# Patient Record
Sex: FEMALE | Race: WHITE | NOT HISPANIC OR LATINO | Employment: OTHER | URBAN - METROPOLITAN AREA
[De-identification: names, ages, dates, MRNs, and addresses within clinical notes are randomized per-mention and may not be internally consistent; named-entity substitution may affect disease eponyms.]

---

## 2018-04-17 ENCOUNTER — HOSPITAL ENCOUNTER (OUTPATIENT)
Facility: HOSPITAL | Age: 58
Discharge: HOME | End: 2018-04-20
Attending: EMERGENCY MEDICINE | Admitting: HOSPITALIST
Payer: OTHER GOVERNMENT

## 2018-04-17 DIAGNOSIS — K92.2: ICD-10-CM

## 2018-04-17 DIAGNOSIS — D64.9 ANEMIA, UNSPECIFIED TYPE: Primary | ICD-10-CM

## 2018-04-17 DIAGNOSIS — K92.1 MELENA: ICD-10-CM

## 2018-04-17 LAB
ALBUMIN SERPL BCP-MCNC: 3.7 G/DL
ALP SERPL-CCNC: 131 U/L
ALT SERPL W/O P-5'-P-CCNC: 78 U/L
ANION GAP SERPL CALC-SCNC: 8 MMOL/L
AST SERPL-CCNC: 70 U/L
BACTERIA #/AREA URNS AUTO: ABNORMAL /HPF
BASOPHILS # BLD AUTO: 0.07 K/UL
BASOPHILS NFR BLD: 0.6 %
BILIRUB SERPL-MCNC: 0.3 MG/DL
BILIRUB UR QL STRIP: NEGATIVE
BUN SERPL-MCNC: 13 MG/DL
CALCIUM SERPL-MCNC: 9.3 MG/DL
CHLORIDE SERPL-SCNC: 106 MMOL/L
CLARITY UR REFRACT.AUTO: CLEAR
CO2 SERPL-SCNC: 23 MMOL/L
COLOR UR AUTO: YELLOW
CREAT SERPL-MCNC: 0.7 MG/DL
DIFFERENTIAL METHOD: ABNORMAL
EOSINOPHIL # BLD AUTO: 0.2 K/UL
EOSINOPHIL NFR BLD: 1.8 %
ERYTHROCYTE [DISTWIDTH] IN BLOOD BY AUTOMATED COUNT: 21.2 %
EST. GFR  (AFRICAN AMERICAN): >60 ML/MIN/1.73 M^2
EST. GFR  (NON AFRICAN AMERICAN): >60 ML/MIN/1.73 M^2
GLUCOSE SERPL-MCNC: 96 MG/DL
GLUCOSE UR QL STRIP: NEGATIVE
HCT VFR BLD AUTO: 25 %
HGB BLD-MCNC: 7 G/DL
HGB UR QL STRIP: NEGATIVE
IMM GRANULOCYTES # BLD AUTO: 0.03 K/UL
IMM GRANULOCYTES NFR BLD AUTO: 0.3 %
INR PPP: 1
KETONES UR QL STRIP: NEGATIVE
LEUKOCYTE ESTERASE UR QL STRIP: ABNORMAL
LIPASE SERPL-CCNC: 41 U/L
LYMPHOCYTES # BLD AUTO: 3.7 K/UL
LYMPHOCYTES NFR BLD: 32.5 %
MCH RBC QN AUTO: 18 PG
MCHC RBC AUTO-ENTMCNC: 28 G/DL
MCV RBC AUTO: 64 FL
MICROSCOPIC COMMENT: ABNORMAL
MONOCYTES # BLD AUTO: 1.9 K/UL
MONOCYTES NFR BLD: 16.4 %
NEUTROPHILS # BLD AUTO: 5.5 K/UL
NEUTROPHILS NFR BLD: 48.4 %
NITRITE UR QL STRIP: POSITIVE
NRBC BLD-RTO: 0 /100 WBC
PH UR STRIP: 6 [PH] (ref 5–8)
PLATELET # BLD AUTO: 791 K/UL
PMV BLD AUTO: 9.8 FL
POTASSIUM SERPL-SCNC: 4.3 MMOL/L
PROT SERPL-MCNC: 8.7 G/DL
PROT UR QL STRIP: NEGATIVE
PROTHROMBIN TIME: 10.5 SEC
RBC # BLD AUTO: 3.88 M/UL
RBC #/AREA URNS AUTO: 3 /HPF (ref 0–4)
SODIUM SERPL-SCNC: 137 MMOL/L
SP GR UR STRIP: 1 (ref 1–1.03)
SQUAMOUS #/AREA URNS AUTO: 0 /HPF
URN SPEC COLLECT METH UR: ABNORMAL
UROBILINOGEN UR STRIP-ACNC: NEGATIVE EU/DL
WBC # BLD AUTO: 11.26 K/UL
WBC #/AREA URNS AUTO: 15 /HPF (ref 0–5)

## 2018-04-17 PROCEDURE — 99285 EMERGENCY DEPT VISIT HI MDM: CPT | Mod: 25

## 2018-04-17 PROCEDURE — 86870 RBC ANTIBODY IDENTIFICATION: CPT

## 2018-04-17 PROCEDURE — 84145 PROCALCITONIN (PCT): CPT

## 2018-04-17 PROCEDURE — 87086 URINE CULTURE/COLONY COUNT: CPT

## 2018-04-17 PROCEDURE — 96365 THER/PROPH/DIAG IV INF INIT: CPT

## 2018-04-17 PROCEDURE — 96375 TX/PRO/DX INJ NEW DRUG ADDON: CPT

## 2018-04-17 PROCEDURE — 25000003 PHARM REV CODE 250: Performed by: PHYSICIAN ASSISTANT

## 2018-04-17 PROCEDURE — 63600175 PHARM REV CODE 636 W HCPCS: Performed by: PHYSICIAN ASSISTANT

## 2018-04-17 PROCEDURE — C9113 INJ PANTOPRAZOLE SODIUM, VIA: HCPCS | Performed by: PHYSICIAN ASSISTANT

## 2018-04-17 PROCEDURE — 86901 BLOOD TYPING SEROLOGIC RH(D): CPT

## 2018-04-17 PROCEDURE — 87088 URINE BACTERIA CULTURE: CPT

## 2018-04-17 PROCEDURE — 85610 PROTHROMBIN TIME: CPT

## 2018-04-17 PROCEDURE — 85025 COMPLETE CBC W/AUTO DIFF WBC: CPT

## 2018-04-17 PROCEDURE — 87077 CULTURE AEROBIC IDENTIFY: CPT

## 2018-04-17 PROCEDURE — 86922 COMPATIBILITY TEST ANTIGLOB: CPT

## 2018-04-17 PROCEDURE — 93010 ELECTROCARDIOGRAM REPORT: CPT | Mod: ,,, | Performed by: INTERNAL MEDICINE

## 2018-04-17 PROCEDURE — 87186 SC STD MICRODIL/AGAR DIL: CPT

## 2018-04-17 PROCEDURE — 81001 URINALYSIS AUTO W/SCOPE: CPT

## 2018-04-17 PROCEDURE — 99283 EMERGENCY DEPT VISIT LOW MDM: CPT | Mod: ,,, | Performed by: PHYSICIAN ASSISTANT

## 2018-04-17 PROCEDURE — 80053 COMPREHEN METABOLIC PANEL: CPT

## 2018-04-17 PROCEDURE — 93005 ELECTROCARDIOGRAM TRACING: CPT

## 2018-04-17 PROCEDURE — 83690 ASSAY OF LIPASE: CPT

## 2018-04-17 RX ORDER — MORPHINE SULFATE 4 MG/ML
4 INJECTION, SOLUTION INTRAMUSCULAR; INTRAVENOUS
Status: COMPLETED | OUTPATIENT
Start: 2018-04-17 | End: 2018-04-17

## 2018-04-17 RX ORDER — PANTOPRAZOLE SODIUM 40 MG/10ML
80 INJECTION, POWDER, LYOPHILIZED, FOR SOLUTION INTRAVENOUS
Status: DISCONTINUED | OUTPATIENT
Start: 2018-04-17 | End: 2018-04-17

## 2018-04-17 RX ADMIN — DEXTROSE 80 MG: 50 INJECTION, SOLUTION INTRAVENOUS at 11:04

## 2018-04-17 RX ADMIN — MORPHINE SULFATE 4 MG: 4 INJECTION INTRAVENOUS at 11:04

## 2018-04-17 RX ADMIN — SODIUM CHLORIDE 1000 ML: 0.9 INJECTION, SOLUTION INTRAVENOUS at 11:04

## 2018-04-18 ENCOUNTER — SURGERY (OUTPATIENT)
Age: 58
End: 2018-04-18

## 2018-04-18 ENCOUNTER — ANESTHESIA (OUTPATIENT)
Dept: ENDOSCOPY | Facility: HOSPITAL | Age: 58
End: 2018-04-18
Payer: OTHER GOVERNMENT

## 2018-04-18 ENCOUNTER — ANESTHESIA EVENT (OUTPATIENT)
Dept: ENDOSCOPY | Facility: HOSPITAL | Age: 58
End: 2018-04-18
Payer: OTHER GOVERNMENT

## 2018-04-18 PROBLEM — K55.20 AVM (ARTERIOVENOUS MALFORMATION) OF SMALL BOWEL, ACQUIRED: Status: ACTIVE | Noted: 2018-04-18

## 2018-04-18 PROBLEM — T74.91XA DOMESTIC ABUSE OF ADULT: Status: ACTIVE | Noted: 2018-04-18

## 2018-04-18 PROBLEM — N39.0 UTI (URINARY TRACT INFECTION): Status: ACTIVE | Noted: 2018-04-18

## 2018-04-18 PROBLEM — E03.9 ACQUIRED HYPOTHYROIDISM: Status: ACTIVE | Noted: 2018-04-18

## 2018-04-18 PROBLEM — D50.9 IRON DEFICIENCY ANEMIA: Status: ACTIVE | Noted: 2018-04-18

## 2018-04-18 LAB
ABO + RH BLD: NORMAL
ALBUMIN SERPL BCP-MCNC: 3.1 G/DL
ALP SERPL-CCNC: 115 U/L
ALT SERPL W/O P-5'-P-CCNC: 68 U/L
ANION GAP SERPL CALC-SCNC: 9 MMOL/L
APTT BLDCRRT: 22.3 SEC
AST SERPL-CCNC: 57 U/L
BASOPHILS # BLD AUTO: 0.07 K/UL
BASOPHILS # BLD AUTO: 0.09 K/UL
BASOPHILS NFR BLD: 0.5 %
BASOPHILS NFR BLD: 0.6 %
BILIRUB SERPL-MCNC: 0.3 MG/DL
BLD GP AB SCN CELLS X3 SERPL QL: NORMAL
BLD PROD TYP BPU: NORMAL
BLOOD GROUP ANTIBODIES SERPL: NORMAL
BLOOD UNIT EXPIRATION DATE: NORMAL
BLOOD UNIT TYPE CODE: 6200
BLOOD UNIT TYPE: NORMAL
BUN SERPL-MCNC: 10 MG/DL
CALCIUM SERPL-MCNC: 8.4 MG/DL
CHLORIDE SERPL-SCNC: 110 MMOL/L
CK SERPL-CCNC: 33 U/L
CO2 SERPL-SCNC: 22 MMOL/L
CODING SYSTEM: NORMAL
CREAT SERPL-MCNC: 0.7 MG/DL
DIFFERENTIAL METHOD: ABNORMAL
DIFFERENTIAL METHOD: ABNORMAL
DISPENSE STATUS: NORMAL
EOSINOPHIL # BLD AUTO: 0.2 K/UL
EOSINOPHIL # BLD AUTO: 0.3 K/UL
EOSINOPHIL NFR BLD: 1.2 %
EOSINOPHIL NFR BLD: 2.6 %
ERYTHROCYTE [DISTWIDTH] IN BLOOD BY AUTOMATED COUNT: 20.8 %
ERYTHROCYTE [DISTWIDTH] IN BLOOD BY AUTOMATED COUNT: 24.5 %
EST. GFR  (AFRICAN AMERICAN): >60 ML/MIN/1.73 M^2
EST. GFR  (NON AFRICAN AMERICAN): >60 ML/MIN/1.73 M^2
ESTIMATED AVG GLUCOSE: 97 MG/DL
FERRITIN SERPL-MCNC: 8 NG/ML
GLUCOSE SERPL-MCNC: 83 MG/DL
HBA1C MFR BLD HPLC: 5 %
HCT VFR BLD AUTO: 22.4 %
HCT VFR BLD AUTO: 28.3 %
HGB BLD-MCNC: 6.3 G/DL
HGB BLD-MCNC: 8.1 G/DL
IMM GRANULOCYTES # BLD AUTO: 0.03 K/UL
IMM GRANULOCYTES # BLD AUTO: 0.09 K/UL
IMM GRANULOCYTES NFR BLD AUTO: 0.3 %
IMM GRANULOCYTES NFR BLD AUTO: 0.5 %
INR PPP: 1
IRON SERPL-MCNC: 12 UG/DL
LACTATE SERPL-SCNC: 0.8 MMOL/L
LITHIUM SERPL-SCNC: <0.1 MMOL/L
LYMPHOCYTES # BLD AUTO: 2.4 K/UL
LYMPHOCYTES # BLD AUTO: 3.1 K/UL
LYMPHOCYTES NFR BLD: 14 %
LYMPHOCYTES NFR BLD: 27.8 %
MAGNESIUM SERPL-MCNC: 1.9 MG/DL
MCH RBC QN AUTO: 18.1 PG
MCH RBC QN AUTO: 19.4 PG
MCHC RBC AUTO-ENTMCNC: 28.1 G/DL
MCHC RBC AUTO-ENTMCNC: 28.6 G/DL
MCV RBC AUTO: 64 FL
MCV RBC AUTO: 68 FL
MONOCYTES # BLD AUTO: 1.6 K/UL
MONOCYTES # BLD AUTO: 1.8 K/UL
MONOCYTES NFR BLD: 15.8 %
MONOCYTES NFR BLD: 9.3 %
NEUTROPHILS # BLD AUTO: 12.9 K/UL
NEUTROPHILS # BLD AUTO: 6 K/UL
NEUTROPHILS NFR BLD: 52.9 %
NEUTROPHILS NFR BLD: 74.5 %
NRBC BLD-RTO: 0 /100 WBC
NRBC BLD-RTO: 0 /100 WBC
PHOSPHATE SERPL-MCNC: 3.5 MG/DL
PLATELET # BLD AUTO: 765 K/UL
PLATELET # BLD AUTO: 831 K/UL
PMV BLD AUTO: 9.1 FL
PMV BLD AUTO: 9.4 FL
POTASSIUM SERPL-SCNC: 3.9 MMOL/L
PROCALCITONIN SERPL IA-MCNC: 0.05 NG/ML
PROT SERPL-MCNC: 7.3 G/DL
PROTHROMBIN TIME: 10.6 SEC
RBC # BLD AUTO: 3.48 M/UL
RBC # BLD AUTO: 4.17 M/UL
RETICS/RBC NFR AUTO: 1.4 %
SATURATED IRON: 3 %
SODIUM SERPL-SCNC: 141 MMOL/L
T4 FREE SERPL-MCNC: 0.68 NG/DL
TOTAL IRON BINDING CAPACITY: 478 UG/DL
TRANS ERYTHROCYTES VOL PATIENT: NORMAL ML
TRANSFERRIN SERPL-MCNC: 323 MG/DL
TRANSFERRIN SERPL-MCNC: 323 MG/DL
TSH SERPL DL<=0.005 MIU/L-ACNC: 7.79 UIU/ML
WBC # BLD AUTO: 11.28 K/UL
WBC # BLD AUTO: 17.38 K/UL

## 2018-04-18 PROCEDURE — 85610 PROTHROMBIN TIME: CPT

## 2018-04-18 PROCEDURE — D9220A PRA ANESTHESIA: Mod: ,,, | Performed by: ANESTHESIOLOGY

## 2018-04-18 PROCEDURE — 80053 COMPREHEN METABOLIC PANEL: CPT

## 2018-04-18 PROCEDURE — 44369 SMALL BOWEL ENDOSCOPY: CPT | Performed by: INTERNAL MEDICINE

## 2018-04-18 PROCEDURE — 87040 BLOOD CULTURE FOR BACTERIA: CPT | Mod: 59

## 2018-04-18 PROCEDURE — C9113 INJ PANTOPRAZOLE SODIUM, VIA: HCPCS | Performed by: STUDENT IN AN ORGANIZED HEALTH CARE EDUCATION/TRAINING PROGRAM

## 2018-04-18 PROCEDURE — 83735 ASSAY OF MAGNESIUM: CPT

## 2018-04-18 PROCEDURE — 97802 MEDICAL NUTRITION INDIV IN: CPT

## 2018-04-18 PROCEDURE — 84443 ASSAY THYROID STIM HORMONE: CPT

## 2018-04-18 PROCEDURE — P9021 RED BLOOD CELLS UNIT: HCPCS

## 2018-04-18 PROCEDURE — 63600175 PHARM REV CODE 636 W HCPCS: Performed by: STUDENT IN AN ORGANIZED HEALTH CARE EDUCATION/TRAINING PROGRAM

## 2018-04-18 PROCEDURE — 83540 ASSAY OF IRON: CPT

## 2018-04-18 PROCEDURE — 84439 ASSAY OF FREE THYROXINE: CPT

## 2018-04-18 PROCEDURE — 82728 ASSAY OF FERRITIN: CPT

## 2018-04-18 PROCEDURE — 44369 SMALL BOWEL ENDOSCOPY: CPT | Mod: ,,, | Performed by: INTERNAL MEDICINE

## 2018-04-18 PROCEDURE — 63600175 PHARM REV CODE 636 W HCPCS: Performed by: NURSE ANESTHETIST, CERTIFIED REGISTERED

## 2018-04-18 PROCEDURE — G0378 HOSPITAL OBSERVATION PER HR: HCPCS

## 2018-04-18 PROCEDURE — 00731 ANES UPR GI NDSC PX NOS: CPT | Performed by: INTERNAL MEDICINE

## 2018-04-18 PROCEDURE — 37000009 HC ANESTHESIA EA ADD 15 MINS: Performed by: INTERNAL MEDICINE

## 2018-04-18 PROCEDURE — 25000003 PHARM REV CODE 250: Performed by: STUDENT IN AN ORGANIZED HEALTH CARE EDUCATION/TRAINING PROGRAM

## 2018-04-18 PROCEDURE — 99214 OFFICE O/P EST MOD 30 MIN: CPT | Mod: ,,, | Performed by: INTERNAL MEDICINE

## 2018-04-18 PROCEDURE — 37000008 HC ANESTHESIA 1ST 15 MINUTES: Performed by: INTERNAL MEDICINE

## 2018-04-18 PROCEDURE — 36415 COLL VENOUS BLD VENIPUNCTURE: CPT

## 2018-04-18 PROCEDURE — 83605 ASSAY OF LACTIC ACID: CPT

## 2018-04-18 PROCEDURE — 83036 HEMOGLOBIN GLYCOSYLATED A1C: CPT

## 2018-04-18 PROCEDURE — 85025 COMPLETE CBC W/AUTO DIFF WBC: CPT

## 2018-04-18 PROCEDURE — 86902 BLOOD TYPE ANTIGEN DONOR EA: CPT

## 2018-04-18 PROCEDURE — 85730 THROMBOPLASTIN TIME PARTIAL: CPT

## 2018-04-18 PROCEDURE — 86922 COMPATIBILITY TEST ANTIGLOB: CPT

## 2018-04-18 PROCEDURE — 80178 ASSAY OF LITHIUM: CPT

## 2018-04-18 PROCEDURE — 25000003 PHARM REV CODE 250: Performed by: NURSE ANESTHETIST, CERTIFIED REGISTERED

## 2018-04-18 PROCEDURE — 82550 ASSAY OF CK (CPK): CPT

## 2018-04-18 PROCEDURE — 84100 ASSAY OF PHOSPHORUS: CPT

## 2018-04-18 PROCEDURE — 85045 AUTOMATED RETICULOCYTE COUNT: CPT

## 2018-04-18 PROCEDURE — 99220 PR INITIAL OBSERVATION CARE,LEVL III: CPT | Mod: ,,, | Performed by: INTERNAL MEDICINE

## 2018-04-18 RX ORDER — PROPOFOL 10 MG/ML
VIAL (ML) INTRAVENOUS CONTINUOUS PRN
Status: DISCONTINUED | OUTPATIENT
Start: 2018-04-18 | End: 2018-04-18

## 2018-04-18 RX ORDER — SODIUM CHLORIDE 0.9 % (FLUSH) 0.9 %
5 SYRINGE (ML) INJECTION
Status: DISCONTINUED | OUTPATIENT
Start: 2018-04-18 | End: 2018-04-20 | Stop reason: HOSPADM

## 2018-04-18 RX ORDER — TRAZODONE HYDROCHLORIDE 100 MG/1
100 TABLET ORAL NIGHTLY
Status: DISCONTINUED | OUTPATIENT
Start: 2018-04-18 | End: 2018-04-20 | Stop reason: HOSPADM

## 2018-04-18 RX ORDER — IBUPROFEN 200 MG
16 TABLET ORAL
Status: DISCONTINUED | OUTPATIENT
Start: 2018-04-18 | End: 2018-04-20 | Stop reason: HOSPADM

## 2018-04-18 RX ORDER — CEFTRIAXONE 1 G/1
1 INJECTION, POWDER, FOR SOLUTION INTRAMUSCULAR; INTRAVENOUS
Status: DISCONTINUED | OUTPATIENT
Start: 2018-04-18 | End: 2018-04-20 | Stop reason: HOSPADM

## 2018-04-18 RX ORDER — SODIUM CHLORIDE 0.9 % (FLUSH) 0.9 %
3 SYRINGE (ML) INJECTION
Status: DISCONTINUED | OUTPATIENT
Start: 2018-04-18 | End: 2018-04-20 | Stop reason: HOSPADM

## 2018-04-18 RX ORDER — LEVOTHYROXINE SODIUM 112 UG/1
112 TABLET ORAL DAILY
Status: DISCONTINUED | OUTPATIENT
Start: 2018-04-18 | End: 2018-04-20 | Stop reason: HOSPADM

## 2018-04-18 RX ORDER — SODIUM CHLORIDE 9 MG/ML
INJECTION, SOLUTION INTRAVENOUS CONTINUOUS
Status: DISCONTINUED | OUTPATIENT
Start: 2018-04-18 | End: 2018-04-19

## 2018-04-18 RX ORDER — HYDROCODONE BITARTRATE AND ACETAMINOPHEN 5; 325 MG/1; MG/1
1 TABLET ORAL EVERY 6 HOURS PRN
Status: DISCONTINUED | OUTPATIENT
Start: 2018-04-18 | End: 2018-04-20 | Stop reason: HOSPADM

## 2018-04-18 RX ORDER — LANOLIN ALCOHOL/MO/W.PET/CERES
800 CREAM (GRAM) TOPICAL
Status: DISCONTINUED | OUTPATIENT
Start: 2018-04-18 | End: 2018-04-18

## 2018-04-18 RX ORDER — HYDROCODONE BITARTRATE AND ACETAMINOPHEN 500; 5 MG/1; MG/1
TABLET ORAL
Status: DISCONTINUED | OUTPATIENT
Start: 2018-04-18 | End: 2018-04-19

## 2018-04-18 RX ORDER — SODIUM CHLORIDE 9 MG/ML
INJECTION, SOLUTION INTRAVENOUS CONTINUOUS PRN
Status: DISCONTINUED | OUTPATIENT
Start: 2018-04-18 | End: 2018-04-18

## 2018-04-18 RX ORDER — AMITRIPTYLINE HYDROCHLORIDE 100 MG/1
100 TABLET ORAL NIGHTLY
Status: DISCONTINUED | OUTPATIENT
Start: 2018-04-18 | End: 2018-04-20 | Stop reason: HOSPADM

## 2018-04-18 RX ORDER — POTASSIUM CHLORIDE 20 MEQ/15ML
40 SOLUTION ORAL
Status: DISCONTINUED | OUTPATIENT
Start: 2018-04-18 | End: 2018-04-18

## 2018-04-18 RX ORDER — GLUCAGON 1 MG
1 KIT INJECTION
Status: DISCONTINUED | OUTPATIENT
Start: 2018-04-18 | End: 2018-04-20 | Stop reason: HOSPADM

## 2018-04-18 RX ORDER — IBUPROFEN 200 MG
24 TABLET ORAL
Status: DISCONTINUED | OUTPATIENT
Start: 2018-04-18 | End: 2018-04-20 | Stop reason: HOSPADM

## 2018-04-18 RX ORDER — PROPOFOL 10 MG/ML
VIAL (ML) INTRAVENOUS
Status: DISCONTINUED | OUTPATIENT
Start: 2018-04-18 | End: 2018-04-18

## 2018-04-18 RX ORDER — ACETAMINOPHEN 325 MG/1
650 TABLET ORAL EVERY 4 HOURS PRN
Status: DISCONTINUED | OUTPATIENT
Start: 2018-04-18 | End: 2018-04-20 | Stop reason: HOSPADM

## 2018-04-18 RX ORDER — LITHIUM CARBONATE 450 MG/1
450 TABLET ORAL EVERY 12 HOURS
Status: DISCONTINUED | OUTPATIENT
Start: 2018-04-18 | End: 2018-04-20 | Stop reason: HOSPADM

## 2018-04-18 RX ORDER — LIDOCAINE HYDROCHLORIDE 10 MG/ML
INJECTION, SOLUTION INTRAVENOUS
Status: DISCONTINUED | OUTPATIENT
Start: 2018-04-18 | End: 2018-04-18

## 2018-04-18 RX ADMIN — HYDROCODONE BITARTRATE AND ACETAMINOPHEN 1 TABLET: 5; 325 TABLET ORAL at 09:04

## 2018-04-18 RX ADMIN — HYDROCODONE BITARTRATE AND ACETAMINOPHEN 1 TABLET: 5; 325 TABLET ORAL at 04:04

## 2018-04-18 RX ADMIN — TRAZODONE HYDROCHLORIDE 100 MG: 100 TABLET ORAL at 03:04

## 2018-04-18 RX ADMIN — HYDROCODONE BITARTRATE AND ACETAMINOPHEN 1 TABLET: 5; 325 TABLET ORAL at 02:04

## 2018-04-18 RX ADMIN — AMITRIPTYLINE HYDROCHLORIDE 100 MG: 100 TABLET, FILM COATED ORAL at 04:04

## 2018-04-18 RX ADMIN — SODIUM CHLORIDE: 0.9 INJECTION, SOLUTION INTRAVENOUS at 01:04

## 2018-04-18 RX ADMIN — DEXTROSE 40 MG: 50 INJECTION, SOLUTION INTRAVENOUS at 08:04

## 2018-04-18 RX ADMIN — TRAZODONE HYDROCHLORIDE 100 MG: 100 TABLET ORAL at 09:04

## 2018-04-18 RX ADMIN — LITHIUM CARBONATE 450 MG: 450 TABLET, EXTENDED RELEASE ORAL at 10:04

## 2018-04-18 RX ADMIN — DEXTROSE 40 MG: 50 INJECTION, SOLUTION INTRAVENOUS at 09:04

## 2018-04-18 RX ADMIN — LEVOTHYROXINE SODIUM 112 MCG: 112 TABLET ORAL at 02:04

## 2018-04-18 RX ADMIN — AMITRIPTYLINE HYDROCHLORIDE 100 MG: 100 TABLET, FILM COATED ORAL at 09:04

## 2018-04-18 RX ADMIN — CEFTRIAXONE SODIUM 1 G: 1 INJECTION, POWDER, FOR SOLUTION INTRAMUSCULAR; INTRAVENOUS at 03:04

## 2018-04-18 RX ADMIN — PROPOFOL 100 MG: 10 INJECTION, EMULSION INTRAVENOUS at 01:04

## 2018-04-18 RX ADMIN — PROPOFOL 125 MCG/KG/MIN: 10 INJECTION, EMULSION INTRAVENOUS at 01:04

## 2018-04-18 RX ADMIN — LITHIUM CARBONATE 450 MG: 450 TABLET, EXTENDED RELEASE ORAL at 02:04

## 2018-04-18 RX ADMIN — LIDOCAINE HYDROCHLORIDE 100 MG: 10 INJECTION, SOLUTION INTRAVENOUS at 01:04

## 2018-04-18 NOTE — ASSESSMENT & PLAN NOTE
- Off Lithium for the last 6 months, no episodes of manuel   - Resumed Lithium for now and ensure her medications prescription prior discharge

## 2018-04-18 NOTE — ASSESSMENT & PLAN NOTE
- Recurrent DVT, and PE (per patient [id number]) and she was on long term anticoagulation with Warfarin with frequent GI bleeding and on 2016 she stopped taking anticoagulation
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
## 2018-04-18 NOTE — PLAN OF CARE
Problem: Patient Care Overview  Goal: Plan of Care Review  Outcome: Ongoing (interventions implemented as appropriate)  Recommendations     1. When medically feasible, ADAT to clear liquid, then regular.   2. If unable to advance diet & parenteral nutrition warranted, please re-consult RD.   3. RD to monitor & follow-up.

## 2018-04-18 NOTE — ASSESSMENT & PLAN NOTE
-  Known to have angioectasia in the stomach in previous EGD 2015 which was treated with argon plasma coagulation.   - She was on chronic anticoagulation until 2016 where she stopped taking Warfarin

## 2018-04-18 NOTE — PLAN OF CARE
[facility] [address] [place] [address] ph# [phone]      PHARMACY- [address] [address]    PT HAS A RIDE HOME AND FAMILY SUPPORT WITH FRIEND. PT WAS STAYING IN A [facility], HOWEVER SHE HAS NOW MOVE IN WITH A FRIEND.        04/18/18 1102   Discharge Assessment   Assessment Type Discharge Planning Assessment   Confirmed/corrected address and phone number on facesheet? Yes   Assessment information obtained from? Patient   Expected Length of Stay (days) 3   Communicated expected length of stay with patient/caregiver yes   Prior to hospitilization cognitive status: Alert/Oriented   Prior to hospitalization functional status: Independent   Current cognitive status: Alert/Oriented   Current Functional Status: Independent   Lives With friend(s)   Able to Return to Prior Arrangements yes   Is patient able to care for self after discharge? Yes   Who are your caregiver(s) and their phone number(s)? Pt's friend    Patient's perception of discharge disposition homeless   Readmission Within The Last 30 Days no previous admission in last 30 days   Patient currently being followed by outpatient case management? No   Patient currently receives any other outside agency services? No   Equipment Currently Used at Home none   Do you have any problems affording any of your prescribed medications? No   Is the patient taking medications as prescribed? yes   Does the patient have transportation home? Yes   Transportation Available car;family or friend will provide   Does the patient receive services at the [facility]? No   Discharge Plan A Home   Discharge Plan B Home   Patient/Family In Agreement With Plan yes

## 2018-04-18 NOTE — PROVATION PATIENT INSTRUCTIONS
Discharge Summary/Instructions after an Endoscopic Procedure  Patient Name: Shreya Bae  Patient MRN: [id number]  Patient YOB: 1960 Wednesday, April 18, 2018  Dean Bae MD  RESTRICTIONS:  During your procedure today, you received medications for sedation.  These   medications may affect your judgment, balance and coordination.  Therefore,   for 24 hours, you have the following restrictions:   - DO NOT drive a car, operate machinery, make legal/financial decisions,   sign important papers or drink alcohol.    ACTIVITY:  The following day: return to full activity including work, except no heavy   lifting, straining or running for 3 days if polyps were removed.  DIET:  Eat and drink normally unless instructed otherwise.     TREATMENT FOR COMMON SIDE EFFECTS:  - Mild abdominal pain, nausea, belching, bloating or excessive gas:  rest,   eat lightly and use a heating pad.  - Sore Throat: treat with throat lozenges and/or gargle with warm salt   water.  - Because air was used during the procedure, expelling large amounts of air   from your rectum or belching is normal.  - If a bowel prep was taken, you may not have a bowel movement for 1-3 days.    This is normal.  SYMPTOMS TO WATCH FOR AND REPORT TO YOUR PHYSICIAN:  1. Abdominal pain or bloating, other than gas cramps.  2. Chest pain.  3. Back pain.  4. Signs of infection such as: chills or fever occurring within 24 hours   after the procedure.  5. Rectal bleeding, which would show as bright red, maroon, or black stools.   (A tablespoon of blood from the rectum is not serious, especially if   hemorrhoids are present.)  6. Vomiting.  7. Weakness or dizziness.  GO DIRECTLY TO THE NEAREST EMERGENCY ROOM IF YOU HAVE ANY OF THE FOLLOWING:      Difficulty breathing              Chills and/or fever over 101 F   Persistent vomiting and/or vomiting blood   Severe abdominal pain   Severe chest pain   Black, tarry stools   Bleeding- more than one tablespoon   Any  other symptom or condition that you feel may need urgent attention  Your doctor recommends these additional instructions:  If any biopsies were taken, your doctors clinic will contact you in 1 to 2   weeks with any results.  - Return patient to [facility] for ongoing care.   - Advance diet as tolerated.   - Continue present medications.   - Return to referring physician after studies are complete.   if bleeding persists, then a video capsule would be next step  For questions, problems or results please call your physician - Dean Bae MD at Work:  [phone].  [facility], EMERGENCY ROOM PHONE NUMBER: [phone]  IF A COMPLICATION OR EMERGENCY SITUATION ARISES AND YOU ARE UNABLE TO REACH   YOUR PHYSICIAN - GO DIRECTLY TO THE EMERGENCY ROOM.  Dean Bae MD  4/18/2018 2:18:54 PM  This report has been verified and signed electronically.
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
## 2018-04-18 NOTE — ASSESSMENT & PLAN NOTE
- She has been off her medication for the last 6 months, will repeat TSH and T4 and resume her Levothyroxine   - Stable problem, no acute change, continue current medication.
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
## 2018-04-18 NOTE — TRANSFER OF CARE
"Anesthesia Transfer of Care Note    Patient: Shreya Bae    Procedure(s) Performed: Procedure(s) (LRB):  ESOPHAGOGASTRODUODENOSCOPY (EGD) (N/A)    Patient location: [facility]    Anesthesia Type: general    Transport from OR: Transported from OR on 2-3 L/min O2 by NC with adequate spontaneous ventilation    Post pain: adequate analgesia    Post assessment: no apparent anesthetic complications and tolerated procedure well    Post vital signs: stable    Level of consciousness: alert, awake and oriented    Nausea/Vomiting: no nausea/vomiting    Complications: none    Transfer of care protocol was followed      Last vitals:   Visit Vitals  BP (!) 161/69   Pulse 86   Temp 37.1 °C (98.7 °F) (Temporal)   Resp 18   Ht 5' 7" (1.702 m)   Wt 80.4 kg (177 lb 4 oz)   SpO2 99%   Breastfeeding? No   BMI 27.76 kg/m²     "

## 2018-04-18 NOTE — CONSULTS
"  [facility]  Adult Nutrition  Consult Note    SUMMARY     Recommendations    1. When medically feasible, ADAT to clear liquid, then regular.   2. If unable to advance diet & parenteral nutrition warranted, please re-consult RD.   3. RD to monitor & follow-up.    Goals: Meet % EEN, EPN  Nutrition Goal Status: new  Communication of RD Recs: reviewed with RN    Reason for Assessment    Reason for Assessment: consult  Diagnosis: other (see comments) (Melena)  Relevant Medical History: Depression  Interdisciplinary Rounds: did not attend    General Information Comments: Pt currently NPO. Decreased appetite & slight wt loss PTA.   Nutrition Discharge Planning: Adequate nutrition    Nutrition/Diet History    Patient Reported Diet/Restrictions/Preferences: general  Do you have any cultural, spiritual, Voodoo conflicts, given your current situation?: none  Factors Affecting Nutritional Intake: NPO, abdominal pain    Anthropometrics    Temp: 97.8 °F (36.6 °C)  Height Method: Stated  Height: 5' 7" (170.2 cm)  Height (inches): 67 in  Weight Method: Bed Scale  Weight: 80.4 kg (177 lb 4 oz)  Weight (lb): 177.25 lb  Ideal Body Weight (IBW), Female: 135 lb  % Ideal Body Weight, Female (lb): 131.3 lb  BMI (Calculated): 27.8  BMI Grade: 25 - 29.9 - overweight  Usual Body Weight (UBW), k kg  % Usual Body Weight: 92.61  % Weight Change From Usual Weight: -7.59 %    Lab/Procedures/Meds    Pertinent Labs Reviewed: reviewed  Pertinent Labs Comments: Stable  Pertinent Medications Reviewed: reviewed    Physical Findings/Assessment    Overall Physical Appearance: nourished  Oral/Mouth Cavity: WDL  Skin: intact    Estimated/Assessed Needs    Weight Used For Calorie Calculations: 80.4 kg (177 lb 4 oz)  Energy Calorie Requirements (kcal): 1765 kcal/d  Energy Need Method: Pettibone-St Jeor (1.25 PAL)     Protein Requirements: 80 g/d (1 g/kg)  Weight Used For Protein Calculations: 80.4 kg (177 lb 4 oz)     Fluid " Need Method: other (see comments) (Per MD or 1 mL/kcal)    Nutrition Prescription Ordered    Current Diet Order: NPO    Nutrition Risk    Level of Risk/Frequency of Follow-up: high     Assessment and Plan    * Melena      Nutrition Problem  Inadequate energy intake    Related to (etiology):   Inability to consume sufficient energy    Signs and Symptoms (as evidenced by):   NPO with no alternate means of nutrition     Nutrition Diagnosis Status:   New         Monitor and Evaluation    Food and Nutrient Intake: energy intake, food and beverage intake  Food and Nutrient Adminstration: diet order  Physical Activity and Function: nutrition-related ADLs and IADLs  Anthropometric Measurements: weight, weight change  Biochemical Data, Medical Tests and Procedures: lipid profile, inflammatory profile, glucose/endocrine profile, electrolyte and renal panel, gastrointestinal profile  Nutrition-Focused Physical Findings: overall appearance     Nutrition Follow-Up    RD Follow-up?: Yes
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
## 2018-04-18 NOTE — ASSESSMENT & PLAN NOTE
- Chronic recurrent UTI, and she has bilateral urinary bladder what is seems to be sacral neuromodulation for treatment of neurogenic bladder.  - UTI is positive nitrite and leukocyte and patient endorsed symptoms of UTI  - Treat with Ceftriaxone 1 g ~ 24 as complicated UTI   -Awaiting urine culture sensitivities, plan to discharge on PO antibiotics one resulted

## 2018-04-18 NOTE — NURSING TRANSFER
Nursing Transfer Note      4/18/2018     Transfer To: 10th floor  Room 1034A     Transfer via stretcher    Transfer with IV Pump    Transported by [employer] sent: n/a    Chart send with patient: Yes    Notified: Echo YAP    Patient reassessed at: 1420 4/18/18 (date, time)    Upon arrival to floor: patient oriented to room, call bell in reach and bed in lowest position

## 2018-04-18 NOTE — ASSESSMENT & PLAN NOTE
- Known to have angioectasia in the stomach in previous EGD 2015 which was treated with argon plasma coagulation.   - DDx of her Melena could be from iron supplement, or new AVM bleeding.   - Received protonix 80 mg IV bolus, will continue on Pantoprazole 40 mg IV BID  - No need for intravascular resuscitation as her Hb around baseline and she is hemodynamically stable  - Discontinue all NSAIDs and Heparin products  - No signs of coagulopathy and she does not take anticoagulation for the last two years.  - Maintain IV access with 2 large bore IVs, BID CBC and Blood bank contact to prepare her unit of pRBC (needs special one given her antibodies).   - Keep NPO now, and GI consult placed for possible evaluation/intervention by morning

## 2018-04-18 NOTE — ASSESSMENT & PLAN NOTE
DDx include ulcers in the setting of NSAIDs and RNY bypass. She has prior history of AVMs, which could be the source. ENRIQUE is likely multifactorial in the setting of inadequate absorption as well as loss.     Plan:  Protonix IV BID  Intravascular resuscitation/support with IVFs   Serial H/H's and pRBCs transfusion as indicated  Discontinue all NSAIDs and Heparin products  Please correct any coagulopathy with platelets and FFP to a goal of platelets >50K and INR <2.0  Maintain IV access with 2 large bore IVs  NPO  Plan for EGD today or emergently if there is hemodynamic compromise in the setting of overt GI bleeding  Please obtain OSH EGD/colon records if possible.   Please notify GI team if there is significant change in patient's clinical status

## 2018-04-18 NOTE — NURSING
RN called blood bank about pt's RBCs. Blood bank stated that the blood probably won't be ready for another couple of hours. Paged Med 5. Awaiting return call.

## 2018-04-18 NOTE — NURSING
Pt arrived to unit. AVSS. Pt complaining of abdominal pain 8 out 10. Norco ordered and administered. Oriented pt to room. Awaiting RBCs from blood bank. Call light in reach. WCTM

## 2018-04-18 NOTE — ED PROVIDER NOTES
Encounter Date: 4/17/2018       History     Chief Complaint   Patient presents with    Abdominal Pain     pt report increasingly worse abd pain since yesterday    Abnormal Lab     pt [facility] and being told her Hemoglobin was 6 and pt needed a blood transfusion; pt reports hx of anemia      57-year-old female arrives to the ER for evaluation of generalized abdominal pain and weakness.  Extensive past medical history of multiple autoimmune disorders.  She has extensive past medical history of chronic GI bleeding.  She has not been here in a couple of years but has had no recent GI bleeds.  Last endoscopy performed 7 months ago and she was told it was normal.  Prior to that she was having recurrent chronic AVM bleeds requiring multiple admissions and transfusions and scopes.   Currently complaining of abdominal pain worsening for the past couple of days generalized weakness and malaise for the past 2 weeks.  Apparently was seen at an outside facility yesterday and was told her hemoglobin was 6.  Upon arrival here, her vital signs are stable, she appears well and non toxic.   She does endorse change in BM, stools more black in nature past few weeks, which is not abnormal for her.           Review of patient's allergies indicates:   Allergen Reactions    Ciprofloxacin Hives    Nsaids (non-steroidal anti-inflammatory drug)      Gi bleed    Phenobarbital Hives    Sulfa (sulfonamide antibiotics) Hives     Past Medical History:   Diagnosis Date    Bipolar 1 disorder     Depression     DVT (deep vein thrombosis) in pregnancy     GERD (gastroesophageal reflux disease)     Insomnia     Thyroid disease      Past Surgical History:   Procedure Laterality Date    ABDOMINAL SURGERY      APPENDECTOMY      CARPAL TUNNEL RELEASE Bilateral     CHOLECYSTECTOMY      GASTRIC BYPASS      HYSTERECTOMY      spleenectomy      THYROID CYST EXCISION      TONSILLECTOMY       No family history on  file.  Social History   Substance Use Topics    Smoking status: Current Every Day Smoker     Packs/day: 0.50     Years: 25.00     Types: Cigarettes    Smokeless tobacco: Not on file    Alcohol use Yes      Comment: Socially     Review of Systems   Constitutional: Negative for fever.   HENT: Negative for sore throat.    Respiratory: Negative for shortness of breath.    Cardiovascular: Negative for chest pain.   Gastrointestinal: Positive for abdominal pain and blood in stool. Negative for nausea.   Genitourinary: Negative for dysuria.   Musculoskeletal: Negative for back pain.   Skin: Negative for rash.   Neurological: Positive for weakness.   Hematological: Does not bruise/bleed easily.       Physical Exam     Initial Vitals [04/17/18 2059]   BP Pulse Resp Temp SpO2   (!) 198/85 90 16 98.5 °F (36.9 °C) 99 %      MAP       122.67         Physical Exam    Constitutional: Vital signs are normal. She appears well-developed and well-nourished.   HENT:   Head: Normocephalic and atraumatic.   Eyes: Conjunctivae are normal.   Cardiovascular: Normal rate and regular rhythm. Exam reveals no gallop and no friction rub.    No murmur heard.  Pulmonary/Chest: Breath sounds normal. No respiratory distress. She has no wheezes. She has no rhonchi. She has no rales.   Abdominal: Soft. Normal appearance and bowel sounds are normal. There is no tenderness.   Genitourinary: Rectal exam shows guaiac negative stool. Guaiac negative stool.   Genitourinary Comments: Brown stool, Heme negative   Musculoskeletal: Normal range of motion.   Neurological: She is alert and oriented to person, place, and time.   Skin: Skin is warm and intact.   Psychiatric: She has a normal mood and affect. Her speech is normal and behavior is normal. Cognition and memory are normal.         ED Course   Procedures  Labs Reviewed   CBC W/ AUTO DIFFERENTIAL - Abnormal; Notable for the following:        Result Value    RBC 3.88 (*)     Hemoglobin 7.0 (*)      Hematocrit 25.0 (*)     MCV 64 (*)     MCH 18.0 (*)     MCHC 28.0 (*)     RDW 21.2 (*)     Platelets 791 (*)     Mono # 1.9 (*)     Mono% 16.4 (*)     All other components within normal limits   COMPREHENSIVE METABOLIC PANEL - Abnormal; Notable for the following:     Total Protein 8.7 (*)     AST 70 (*)     ALT 78 (*)     All other components within normal limits   URINALYSIS, REFLEX TO URINE CULTURE - Abnormal; Notable for the following:     Nitrite, UA Positive (*)     Leukocytes, UA 3+ (*)     All other components within normal limits   URINALYSIS MICROSCOPIC - Abnormal; Notable for the following:     WBC, UA 15 (*)     All other components within normal limits   CULTURE, URINE   LIPASE   PROTIME-INR   TYPE & SCREEN   ANTIBODY IDENTIFICATION   TYPE & SCREEN   PREPARE RBC SOFT             Medical Decision Making:   ED Management:  57-year-old female with generalized malaise and weakness.   Hemoglobin here 7.0  Rectal exam Brown stool Hemoccult negative.   Decision was made to transfuse 1 unit and admitt to observation.  Case discussed with hospitalist                      Clinical Impression:   The encounter diagnosis was Anemia, unspecified type.    Disposition:   Disposition: Placed in Observation  Condition: Stable                        Cole Mcnally PA-C  04/18/18 0148
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
## 2018-04-18 NOTE — SUBJECTIVE & OBJECTIVE
Past Medical History:   Diagnosis Date    Bipolar 1 disorder     Depression     DVT (deep vein thrombosis) in pregnancy     GERD (gastroesophageal reflux disease)     Insomnia     Thyroid disease        Past Surgical History:   Procedure Laterality Date    ABDOMINAL SURGERY      APPENDECTOMY      CARPAL TUNNEL RELEASE Bilateral     CHOLECYSTECTOMY      GASTRIC BYPASS      HYSTERECTOMY      spleenectomy      THYROID CYST EXCISION      TONSILLECTOMY         Review of patient's allergies indicates:   Allergen Reactions    Ciprofloxacin Hives    Nsaids (non-steroidal anti-inflammatory drug)      Gi bleed    Phenobarbital Hives    Sulfa (sulfonamide antibiotics) Hives     Family History     None        Social History Main Topics    Smoking status: Current Every Day Smoker     Packs/day: 0.50     Years: 25.00     Types: Cigarettes    Smokeless tobacco: Not on file    Alcohol use Yes      Comment: Socially    Drug use: Unknown    Sexual activity: Not on file     Review of Systems   Constitutional: Positive for appetite change and fatigue. Negative for activity change and fever.   HENT: Negative for dental problem.    Respiratory: Negative for cough, shortness of breath and wheezing.    Cardiovascular: Negative for chest pain and leg swelling.   Gastrointestinal: Positive for abdominal pain, blood in stool and constipation. Negative for diarrhea, nausea and vomiting.   Genitourinary: Positive for difficulty urinating, dysuria and frequency. Negative for flank pain and hematuria.   Musculoskeletal: Negative for arthralgias and back pain.   Skin: Negative for color change.   Neurological: Negative for weakness.   Psychiatric/Behavioral: Negative for agitation.     Objective:     Vital Signs (Most Recent):  Temp: 97.8 °F (36.6 °C) (04/18/18 0949)  Pulse: 77 (04/18/18 0949)  Resp: 12 (04/18/18 0949)  BP: 136/62 (04/18/18 0949)  SpO2: 96 % (04/18/18 0949) Vital Signs (24h Range):  Temp:  [97.8 °F (36.6  °C)-98.5 °F (36.9 °C)] 97.8 °F (36.6 °C)  Pulse:  [67-90] 77  Resp:  [12-20] 12  SpO2:  [92 %-99 %] 96 %  BP: (136-198)/(62-85) 136/62     Weight: 80.4 kg (177 lb 4 oz) (04/18/18 0347)  Body mass index is 27.76 kg/m².      Intake/Output Summary (Last 24 hours) at 04/18/18 0957  Last data filed at 04/18/18 0414   Gross per 24 hour   Intake               60 ml   Output                0 ml   Net               60 ml       Lines/Drains/Airways     Peripheral Intravenous Line                 Peripheral IV - Single Lumen 04/17/18 2330 Right Antecubital less than 1 day         Peripheral IV - Single Lumen 04/18/18 0306 Left Hand less than 1 day                Physical Exam   Constitutional: She is oriented to person, place, and time. She appears well-developed and well-nourished.   Eyes: No scleral icterus.   Neck: Neck supple.   Cardiovascular: Normal rate and regular rhythm.  Exam reveals no gallop.    Pulmonary/Chest: Effort normal. No respiratory distress. She has no wheezes.   Abdominal: Soft. Bowel sounds are normal. She exhibits no distension and no mass. There is tenderness. There is no rebound and no guarding. No hernia.   Abdominal scars   Musculoskeletal: She exhibits no edema.   Neurological: She is alert and oriented to person, place, and time.   Skin: Skin is warm.   Psychiatric: She has a normal mood and affect. Her behavior is normal.     Lab Results   Component Value Date    WBC 11.28 04/18/2018    HGB 6.3 (L) 04/18/2018    HCT 22.4 (L) 04/18/2018    MCV 64 (L) 04/18/2018     (H) 04/18/2018     Lab Results   Component Value Date    INR 1.0 04/18/2018     Lab Results   Component Value Date     04/18/2018    K 3.9 04/18/2018    CREATININE 0.7 04/18/2018     Lab Results   Component Value Date    ALBUMIN 3.1 (L) 04/18/2018    ALT 68 (H) 04/18/2018    AST 57 (H) 04/18/2018    ALKPHOS 115 04/18/2018    BILITOT 0.3 04/18/2018     No results found for: AFP  Lab Results   Component Value Date    LIPASE  41 04/17/2018    AMYLASE 33 04/10/2006     No results found for: TACROLIMUS    Imaging:  Reviewed and as noted in HPI.

## 2018-04-18 NOTE — NURSING
Patient transferred back to the floor. Blood transfusion stopped in endoscopy at an unknown time. Patient reports headache otherwise swallows fine

## 2018-04-18 NOTE — NURSING
PRBC started with a second nurse co-sign. She was educated on s/s of transfusion reaction. Patient denies any s/s of transfusion reaction. Transfusion turned up to 125cc/hr. Endoscopy called for report, updated patient on time of the procedure.

## 2018-04-18 NOTE — ED NOTES
Blood bank contacted ED and states blood will not be ready for transfusion until at least 06:00. Pt and MD aware.

## 2018-04-18 NOTE — ASSESSMENT & PLAN NOTE
- Microcytic anemia on chronic long taking of Iron supplement   - Will repeat Retic count and iron studies, and hold Iron supplement for now
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
## 2018-04-19 PROBLEM — M54.12 CERVICAL RADICULOPATHY: Status: ACTIVE | Noted: 2018-04-19

## 2018-04-19 LAB
ALBUMIN SERPL BCP-MCNC: 3.2 G/DL
ALP SERPL-CCNC: 113 U/L
ALT SERPL W/O P-5'-P-CCNC: 59 U/L
ANION GAP SERPL CALC-SCNC: 8 MMOL/L
AST SERPL-CCNC: 52 U/L
BASOPHILS # BLD AUTO: 0.09 K/UL
BASOPHILS # BLD AUTO: 0.12 K/UL
BASOPHILS # BLD AUTO: 0.13 K/UL
BASOPHILS NFR BLD: 0.8 %
BASOPHILS NFR BLD: 1.2 %
BASOPHILS NFR BLD: 1.4 %
BILIRUB SERPL-MCNC: 0.3 MG/DL
BUN SERPL-MCNC: 9 MG/DL
CALCIUM SERPL-MCNC: 8.2 MG/DL
CHLORIDE SERPL-SCNC: 104 MMOL/L
CO2 SERPL-SCNC: 25 MMOL/L
CREAT SERPL-MCNC: 0.7 MG/DL
DIFFERENTIAL METHOD: ABNORMAL
EOSINOPHIL # BLD AUTO: 0.3 K/UL
EOSINOPHIL NFR BLD: 2.3 %
EOSINOPHIL NFR BLD: 3 %
EOSINOPHIL NFR BLD: 3.8 %
ERYTHROCYTE [DISTWIDTH] IN BLOOD BY AUTOMATED COUNT: 23.9 %
ERYTHROCYTE [DISTWIDTH] IN BLOOD BY AUTOMATED COUNT: 23.9 %
ERYTHROCYTE [DISTWIDTH] IN BLOOD BY AUTOMATED COUNT: 24.1 %
EST. GFR  (AFRICAN AMERICAN): >60 ML/MIN/1.73 M^2
EST. GFR  (NON AFRICAN AMERICAN): >60 ML/MIN/1.73 M^2
GLUCOSE SERPL-MCNC: 75 MG/DL
HCT VFR BLD AUTO: 26 %
HCT VFR BLD AUTO: 26.4 %
HCT VFR BLD AUTO: 27.8 %
HGB BLD-MCNC: 7.4 G/DL
HGB BLD-MCNC: 7.6 G/DL
HGB BLD-MCNC: 8 G/DL
IMM GRANULOCYTES # BLD AUTO: 0.02 K/UL
IMM GRANULOCYTES # BLD AUTO: 0.03 K/UL
IMM GRANULOCYTES # BLD AUTO: 0.05 K/UL
IMM GRANULOCYTES NFR BLD AUTO: 0.2 %
IMM GRANULOCYTES NFR BLD AUTO: 0.3 %
IMM GRANULOCYTES NFR BLD AUTO: 0.5 %
LYMPHOCYTES # BLD AUTO: 2.7 K/UL
LYMPHOCYTES # BLD AUTO: 3.1 K/UL
LYMPHOCYTES # BLD AUTO: 3.1 K/UL
LYMPHOCYTES NFR BLD: 26.4 %
LYMPHOCYTES NFR BLD: 27 %
LYMPHOCYTES NFR BLD: 33.9 %
MAGNESIUM SERPL-MCNC: 2.1 MG/DL
MCH RBC QN AUTO: 19 PG
MCH RBC QN AUTO: 19.3 PG
MCH RBC QN AUTO: 19.3 PG
MCHC RBC AUTO-ENTMCNC: 28.5 G/DL
MCHC RBC AUTO-ENTMCNC: 28.8 G/DL
MCHC RBC AUTO-ENTMCNC: 28.8 G/DL
MCV RBC AUTO: 67 FL
MONOCYTES # BLD AUTO: 1.6 K/UL
MONOCYTES # BLD AUTO: 1.7 K/UL
MONOCYTES # BLD AUTO: 1.8 K/UL
MONOCYTES NFR BLD: 15.5 %
MONOCYTES NFR BLD: 16 %
MONOCYTES NFR BLD: 18.4 %
NEUTROPHILS # BLD AUTO: 3.8 K/UL
NEUTROPHILS # BLD AUTO: 5.4 K/UL
NEUTROPHILS # BLD AUTO: 6.2 K/UL
NEUTROPHILS NFR BLD: 42.3 %
NEUTROPHILS NFR BLD: 52.9 %
NEUTROPHILS NFR BLD: 54.1 %
NRBC BLD-RTO: 0 /100 WBC
NRBC BLD-RTO: 1 /100 WBC
NRBC BLD-RTO: 1 /100 WBC
PHOSPHATE SERPL-MCNC: 3.8 MG/DL
PLATELET # BLD AUTO: 709 K/UL
PLATELET # BLD AUTO: 715 K/UL
PLATELET # BLD AUTO: 731 K/UL
PMV BLD AUTO: 9 FL
PMV BLD AUTO: 9.1 FL
PMV BLD AUTO: 9.2 FL
POTASSIUM SERPL-SCNC: 4.1 MMOL/L
PROT SERPL-MCNC: 7.6 G/DL
RBC # BLD AUTO: 3.89 M/UL
RBC # BLD AUTO: 3.93 M/UL
RBC # BLD AUTO: 4.15 M/UL
SODIUM SERPL-SCNC: 137 MMOL/L
WBC # BLD AUTO: 10.18 K/UL
WBC # BLD AUTO: 11.48 K/UL
WBC # BLD AUTO: 9.06 K/UL

## 2018-04-19 PROCEDURE — 25000003 PHARM REV CODE 250: Performed by: STUDENT IN AN ORGANIZED HEALTH CARE EDUCATION/TRAINING PROGRAM

## 2018-04-19 PROCEDURE — G8987 SELF CARE CURRENT STATUS: HCPCS | Mod: CI

## 2018-04-19 PROCEDURE — 80053 COMPREHEN METABOLIC PANEL: CPT

## 2018-04-19 PROCEDURE — G8978 MOBILITY CURRENT STATUS: HCPCS | Mod: CI

## 2018-04-19 PROCEDURE — G8980 MOBILITY D/C STATUS: HCPCS | Mod: CI

## 2018-04-19 PROCEDURE — 99225 PR SUBSEQUENT OBSERVATION CARE,LEVEL II: CPT | Mod: ,,, | Performed by: INTERNAL MEDICINE

## 2018-04-19 PROCEDURE — G8988 SELF CARE GOAL STATUS: HCPCS | Mod: CH

## 2018-04-19 PROCEDURE — 36415 COLL VENOUS BLD VENIPUNCTURE: CPT

## 2018-04-19 PROCEDURE — 97530 THERAPEUTIC ACTIVITIES: CPT

## 2018-04-19 PROCEDURE — 97165 OT EVAL LOW COMPLEX 30 MIN: CPT

## 2018-04-19 PROCEDURE — 63600175 PHARM REV CODE 636 W HCPCS: Performed by: STUDENT IN AN ORGANIZED HEALTH CARE EDUCATION/TRAINING PROGRAM

## 2018-04-19 PROCEDURE — 84100 ASSAY OF PHOSPHORUS: CPT

## 2018-04-19 PROCEDURE — 83735 ASSAY OF MAGNESIUM: CPT

## 2018-04-19 PROCEDURE — G0378 HOSPITAL OBSERVATION PER HR: HCPCS

## 2018-04-19 PROCEDURE — 97161 PT EVAL LOW COMPLEX 20 MIN: CPT

## 2018-04-19 PROCEDURE — G8979 MOBILITY GOAL STATUS: HCPCS | Mod: CI

## 2018-04-19 PROCEDURE — 85025 COMPLETE CBC W/AUTO DIFF WBC: CPT | Mod: 91

## 2018-04-19 RX ORDER — CEPHALEXIN 500 MG/1
500 CAPSULE ORAL EVERY 12 HOURS
Qty: 16 CAPSULE | Refills: 0 | Status: SHIPPED | OUTPATIENT
Start: 2018-04-19 | End: 2018-04-27

## 2018-04-19 RX ORDER — PANTOPRAZOLE SODIUM 40 MG/1
40 TABLET, DELAYED RELEASE ORAL DAILY
Status: DISCONTINUED | OUTPATIENT
Start: 2018-04-19 | End: 2018-04-20 | Stop reason: HOSPADM

## 2018-04-19 RX ADMIN — LEVOTHYROXINE SODIUM 112 MCG: 112 TABLET ORAL at 09:04

## 2018-04-19 RX ADMIN — PANTOPRAZOLE SODIUM 40 MG: 40 TABLET, DELAYED RELEASE ORAL at 09:04

## 2018-04-19 RX ADMIN — HYDROCODONE BITARTRATE AND ACETAMINOPHEN 1 TABLET: 5; 325 TABLET ORAL at 03:04

## 2018-04-19 RX ADMIN — CEFTRIAXONE SODIUM 1 G: 1 INJECTION, POWDER, FOR SOLUTION INTRAMUSCULAR; INTRAVENOUS at 02:04

## 2018-04-19 RX ADMIN — HYDROCODONE BITARTRATE AND ACETAMINOPHEN 1 TABLET: 5; 325 TABLET ORAL at 09:04

## 2018-04-19 RX ADMIN — LITHIUM CARBONATE 450 MG: 450 TABLET, EXTENDED RELEASE ORAL at 09:04

## 2018-04-19 RX ADMIN — LITHIUM CARBONATE 450 MG: 450 TABLET, EXTENDED RELEASE ORAL at 12:04

## 2018-04-19 RX ADMIN — AMITRIPTYLINE HYDROCHLORIDE 100 MG: 100 TABLET, FILM COATED ORAL at 09:04

## 2018-04-19 RX ADMIN — TRAZODONE HYDROCHLORIDE 100 MG: 100 TABLET ORAL at 09:04
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
## 2018-04-19 NOTE — ASSESSMENT & PLAN NOTE
- She has been off her medication for the last 6 months, high TSH and low T4 consistent with hypothyroidism, resuming levothyroxine.   -Follow up with PCP/priority clinic to check thyroid labs again   - Stable problem, no acute change, continue current medication.

## 2018-04-19 NOTE — ANESTHESIA POSTPROCEDURE EVALUATION
"Anesthesia Post Evaluation    Patient: Shreya Bae    Procedure(s) Performed: Procedure(s) (LRB):  ESOPHAGOGASTRODUODENOSCOPY (EGD) (N/A)    Final Anesthesia Type: general  Patient location during evaluation: PACU  Patient participation: Yes- Able to Participate  Level of consciousness: awake and alert  Post-procedure vital signs: reviewed and stable  Pain management: adequate  Airway patency: patent  PONV status at discharge: No PONV  Anesthetic complications: no      Cardiovascular status: blood pressure returned to baseline  Respiratory status: unassisted  Hydration status: euvolemic  Follow-up not needed.        Visit Vitals  /62 (Patient Position: Lying)   Pulse 90   Temp 37.2 °C (99 °F) (Oral)   Resp 18   Ht 5' 7" (1.702 m)   Wt 80.4 kg (177 lb 4 oz)   SpO2 97%   Breastfeeding? No   BMI 27.76 kg/m²       Pain/Dennis Score: Pain Assessment Performed: Yes (4/18/2018  8:00 AM)  Presence of Pain: denies (4/18/2018  2:29 PM)  Pain Rating Prior to Med Admin: 9 (4/18/2018  2:57 PM)  Pain Rating Post Med Admin: 7 (4/18/2018  5:20 AM)  Dennis Score: 10 (4/18/2018  2:10 PM)      "

## 2018-04-19 NOTE — ASSESSMENT & PLAN NOTE
- Recurrent DVT, and PE (per patient [id number]) and she was on long term anticoagulation with Warfarin with frequent GI bleeding and on 2016 she stopped taking anticoagulation

## 2018-04-19 NOTE — HOSPITAL COURSE
04/19: EGD with 2 small non-bleeding angiectasias, cauterized by GI. S/P PRBC transfusion. H & H at baseline. Stable at 8.8 & 30.9. Patient also was treated for re-current UTI, urine culture with Klebsiella treated with three days of Rocephin, discharged with PO Keflex for a total treatment of 10 days. Patient also complained of cervical radiculopathy, CT neck revealed no acute process but stable chronic changes from previous laminectomy/procedures. Patient is to follow with GI. Patient also needs to follow up with PCP but due to her insurance, she is unable to follow with priority clinic or PCP in [facility]. SW to give some recommendations up on discharge. Patient will be going to a friend's house on d/c.
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
## 2018-04-19 NOTE — PLAN OF CARE
Problem: Physical Therapy Goal  Goal: Physical Therapy Goal  Outcome: Outcome(s) achieved Date Met: 04/19/18  Patient does not require skilled acute PT services at this time.   Tong Jiang, PT

## 2018-04-19 NOTE — ASSESSMENT & PLAN NOTE
- Microcytic anemia on chronic long taking of Iron supplement   - Will repeat Retic count and iron studies, and hold Iron supplement for now   -Iron studies consistent with iron deficiency anemia, likely secondary to by pass surgery  -consider OP IV iron transfusion.

## 2018-04-19 NOTE — ASSESSMENT & PLAN NOTE
- Off Lithium for the last 6 months, no episodes of manuel   - Resumed Lithium for now and ensure her medications prescription prior discharge   -Lithium level low 0.1, c/w lithium

## 2018-04-19 NOTE — PLAN OF CARE
Problem: Patient Care Overview  Goal: Plan of Care Review  Outcome: Ongoing (interventions implemented as appropriate)  No acute changes overnight. IV abx given as ordered. Pt slept during majority of shift. No falls or injury during shift. Call light in reach. TM
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
## 2018-04-19 NOTE — NURSING
Patient inquired if she was going to be seen by a neurologist and have an MRI, stating that this is the reason she is staying an additional day. The team was paged and stated she was not going to be seen by neurology and that she might have a MRI, her urine is being cultured for proper abx administration. The patient was made aware of the plan of care, she states she is leaving tomorrow despite the culture results and the team was made aware of this.
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
## 2018-04-19 NOTE — ASSESSMENT & PLAN NOTE
- Known to have angioectasia in the stomach in previous EGD 2015 which was treated with argon plasma coagulation.   - DDx of her Melena could be from iron supplement, or new AVM bleeding.   - Received protonix 80 mg IV bolus, will continue on Pantoprazole 40 mg IV BID  - No need for intravascular resuscitation as her Hb around baseline and she is hemodynamically stable  - Discontinue all NSAIDs and Heparin products  - No signs of coagulopathy and she does not take anticoagulation for the last two years.  - Maintain IV access with 2 large bore IVs,   -Transfused 1 U of PRBC, H & H stable with clinical improvement  -EGD yesterday, revealed 2 non-bleeding angio ectasias, please see procedure report, cauterized.  -GI follow up on discharge.   -

## 2018-04-20 VITALS
SYSTOLIC BLOOD PRESSURE: 109 MMHG | RESPIRATION RATE: 12 BRPM | OXYGEN SATURATION: 96 % | BODY MASS INDEX: 27.09 KG/M2 | HEIGHT: 67 IN | HEART RATE: 78 BPM | TEMPERATURE: 98 F | DIASTOLIC BLOOD PRESSURE: 54 MMHG | WEIGHT: 172.63 LBS

## 2018-04-20 LAB
ALBUMIN SERPL BCP-MCNC: 3.3 G/DL
ALP SERPL-CCNC: 115 U/L
ALT SERPL W/O P-5'-P-CCNC: 57 U/L
ANION GAP SERPL CALC-SCNC: 6 MMOL/L
AST SERPL-CCNC: 48 U/L
BACTERIA UR CULT: NORMAL
BASOPHILS # BLD AUTO: 0.11 K/UL
BASOPHILS # BLD AUTO: 0.12 K/UL
BASOPHILS NFR BLD: 1.2 %
BASOPHILS NFR BLD: 1.3 %
BILIRUB SERPL-MCNC: 0.3 MG/DL
BUN SERPL-MCNC: 11 MG/DL
CALCIUM SERPL-MCNC: 9.3 MG/DL
CHLORIDE SERPL-SCNC: 104 MMOL/L
CO2 SERPL-SCNC: 28 MMOL/L
CREAT SERPL-MCNC: 0.8 MG/DL
DIFFERENTIAL METHOD: ABNORMAL
DIFFERENTIAL METHOD: ABNORMAL
EOSINOPHIL # BLD AUTO: 0.4 K/UL
EOSINOPHIL # BLD AUTO: 0.5 K/UL
EOSINOPHIL NFR BLD: 3.8 %
EOSINOPHIL NFR BLD: 5.2 %
ERYTHROCYTE [DISTWIDTH] IN BLOOD BY AUTOMATED COUNT: 24.6 %
ERYTHROCYTE [DISTWIDTH] IN BLOOD BY AUTOMATED COUNT: 25.4 %
EST. GFR  (AFRICAN AMERICAN): >60 ML/MIN/1.73 M^2
EST. GFR  (NON AFRICAN AMERICAN): >60 ML/MIN/1.73 M^2
GLUCOSE SERPL-MCNC: 82 MG/DL
HCT VFR BLD AUTO: 27.2 %
HCT VFR BLD AUTO: 30.9 %
HGB BLD-MCNC: 8.1 G/DL
HGB BLD-MCNC: 8.8 G/DL
IMM GRANULOCYTES # BLD AUTO: 0.02 K/UL
IMM GRANULOCYTES # BLD AUTO: 0.02 K/UL
IMM GRANULOCYTES NFR BLD AUTO: 0.2 %
IMM GRANULOCYTES NFR BLD AUTO: 0.2 %
LYMPHOCYTES # BLD AUTO: 2.4 K/UL
LYMPHOCYTES # BLD AUTO: 3.4 K/UL
LYMPHOCYTES NFR BLD: 26.2 %
LYMPHOCYTES NFR BLD: 35.8 %
MAGNESIUM SERPL-MCNC: 2.2 MG/DL
MCH RBC QN AUTO: 19.3 PG
MCH RBC QN AUTO: 19.6 PG
MCHC RBC AUTO-ENTMCNC: 28.5 G/DL
MCHC RBC AUTO-ENTMCNC: 29.8 G/DL
MCV RBC AUTO: 66 FL
MCV RBC AUTO: 68 FL
MONOCYTES # BLD AUTO: 1.2 K/UL
MONOCYTES # BLD AUTO: 1.7 K/UL
MONOCYTES NFR BLD: 13.3 %
MONOCYTES NFR BLD: 17.7 %
NEUTROPHILS # BLD AUTO: 3.8 K/UL
NEUTROPHILS # BLD AUTO: 5 K/UL
NEUTROPHILS NFR BLD: 39.8 %
NEUTROPHILS NFR BLD: 55.3 %
NRBC BLD-RTO: 1 /100 WBC
NRBC BLD-RTO: 1 /100 WBC
PHOSPHATE SERPL-MCNC: 4 MG/DL
PLATELET # BLD AUTO: 482 K/UL
PLATELET # BLD AUTO: 827 K/UL
PMV BLD AUTO: 9.5 FL
PMV BLD AUTO: 9.8 FL
POTASSIUM SERPL-SCNC: 4.2 MMOL/L
PROT SERPL-MCNC: 8.1 G/DL
RBC # BLD AUTO: 4.13 M/UL
RBC # BLD AUTO: 4.55 M/UL
SODIUM SERPL-SCNC: 138 MMOL/L
WBC # BLD AUTO: 9.12 K/UL
WBC # BLD AUTO: 9.53 K/UL

## 2018-04-20 PROCEDURE — G0378 HOSPITAL OBSERVATION PER HR: HCPCS

## 2018-04-20 PROCEDURE — 63600175 PHARM REV CODE 636 W HCPCS: Performed by: STUDENT IN AN ORGANIZED HEALTH CARE EDUCATION/TRAINING PROGRAM

## 2018-04-20 PROCEDURE — 99217 PR OBSERVATION CARE DISCHARGE: CPT | Mod: ,,, | Performed by: INTERNAL MEDICINE

## 2018-04-20 PROCEDURE — 25000003 PHARM REV CODE 250: Performed by: STUDENT IN AN ORGANIZED HEALTH CARE EDUCATION/TRAINING PROGRAM

## 2018-04-20 PROCEDURE — 84100 ASSAY OF PHOSPHORUS: CPT

## 2018-04-20 PROCEDURE — 80053 COMPREHEN METABOLIC PANEL: CPT

## 2018-04-20 PROCEDURE — 83735 ASSAY OF MAGNESIUM: CPT

## 2018-04-20 PROCEDURE — 36415 COLL VENOUS BLD VENIPUNCTURE: CPT

## 2018-04-20 PROCEDURE — 85025 COMPLETE CBC W/AUTO DIFF WBC: CPT

## 2018-04-20 RX ADMIN — LEVOTHYROXINE SODIUM 112 MCG: 112 TABLET ORAL at 06:04

## 2018-04-20 RX ADMIN — HYDROCODONE BITARTRATE AND ACETAMINOPHEN 1 TABLET: 5; 325 TABLET ORAL at 01:04

## 2018-04-20 RX ADMIN — CEFTRIAXONE SODIUM 1 G: 1 INJECTION, POWDER, FOR SOLUTION INTRAMUSCULAR; INTRAVENOUS at 01:04

## 2018-04-20 RX ADMIN — LITHIUM CARBONATE 450 MG: 450 TABLET, EXTENDED RELEASE ORAL at 08:04

## 2018-04-20 RX ADMIN — PANTOPRAZOLE SODIUM 40 MG: 40 TABLET, DELAYED RELEASE ORAL at 08:04

## 2018-04-20 RX ADMIN — HYDROCODONE BITARTRATE AND ACETAMINOPHEN 1 TABLET: 5; 325 TABLET ORAL at 07:04
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
## 2018-04-20 NOTE — PLAN OF CARE
Problem: Patient Care Overview  Goal: Plan of Care Review  Outcome: Ongoing (interventions implemented as appropriate)  AAOX4 this shift. No active bleeding noted. No c/o pain. Reports neck stiff. Ambulated in hallway with steady gait. Anticipates discharge to home today.

## 2018-04-20 NOTE — ASSESSMENT & PLAN NOTE
- Recurrent DVT, and PE (per patient [id number]) and she was on long term anticoagulation with Warfarin with frequent GI bleeding and on 2016 she stopped taking anticoagulation

## 2018-04-20 NOTE — ASSESSMENT & PLAN NOTE
- Chronic recurrent UTI, and she has bilateral urinary bladder what is seems to be sacral neuromodulation for treatment of neurogenic bladder.  - UTI is positive nitrite and leukocyte and patient endorsed symptoms of UTI  - Treat with Ceftriaxone 1 g ~ 24 as complicated UTI -given for three days  -Urine culture with Klebsiella sensitive to 1st generation cephalosporins, discharge home with PO keflex for a total of 10 day Abx Rx

## 2018-04-20 NOTE — ASSESSMENT & PLAN NOTE
- Known to have angioectasia in the stomach in previous EGD 2015 which was treated with argon plasma coagulation.   - DDx of her Melena could be from iron supplement, or new AVM bleeding.   - Received protonix 80 mg IV bolus, will continue on Pantoprazole 40 mg IV BID  - No need for intravascular resuscitation as her Hb around baseline and she is hemodynamically stable  - Discontinue all NSAIDs and Heparin products  - No signs of coagulopathy and she does not take anticoagulation for the last two years.  - Maintain IV access with 2 large bore IVs,   -Transfused 1 U of PRBC, H & H stable with clinical improvement, 8.8 & 30.9  -EGD yesterday, revealed 2 non-bleeding angio ectasias, please see procedure report, cauterized.  -GI follow up on discharge.   -
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
## 2018-04-21 LAB
BLD PROD TYP BPU: NORMAL
BLD PROD TYP BPU: NORMAL
BLOOD UNIT EXPIRATION DATE: NORMAL
BLOOD UNIT EXPIRATION DATE: NORMAL
BLOOD UNIT TYPE CODE: 6200
BLOOD UNIT TYPE CODE: 6200
BLOOD UNIT TYPE: NORMAL
BLOOD UNIT TYPE: NORMAL
CODING SYSTEM: NORMAL
CODING SYSTEM: NORMAL
DISPENSE STATUS: NORMAL
DISPENSE STATUS: NORMAL
TRANS ERYTHROCYTES VOL PATIENT: NORMAL ML
TRANS ERYTHROCYTES VOL PATIENT: NORMAL ML

## 2018-04-23 LAB
BACTERIA BLD CULT: NORMAL
BACTERIA BLD CULT: NORMAL

## 2019-05-03 PROBLEM — L98.5: Status: ACTIVE | Noted: 2019-05-03

## 2019-07-15 PROBLEM — N39.0 UTI (URINARY TRACT INFECTION): Status: RESOLVED | Noted: 2018-04-18 | Resolved: 2019-07-15

## 2019-07-15 PROBLEM — D50.9 IRON DEFICIENCY ANEMIA: Status: RESOLVED | Noted: 2018-04-18 | Resolved: 2019-07-15

## 2019-07-15 PROBLEM — Z98.84 HX OF BARIATRIC SURGERY: Status: ACTIVE | Noted: 2019-07-15

## 2019-07-15 PROBLEM — Z98.890 HX OF CERVICAL SPINE SURGERY: Status: ACTIVE | Noted: 2019-07-15

## 2019-07-15 PROBLEM — Z87.74 HX OF ARTERIOVENOUS MALFORMATION (AVM): Status: RESOLVED | Noted: 2019-07-15 | Resolved: 2019-07-15

## 2019-07-15 PROBLEM — Z87.19 HISTORY OF GI BLEED: Status: ACTIVE | Noted: 2019-07-15

## 2019-07-15 PROBLEM — Z87.74 HX OF ARTERIOVENOUS MALFORMATION (AVM): Status: ACTIVE | Noted: 2019-07-15

## 2019-07-16 PROBLEM — M25.512 LEFT SHOULDER PAIN: Status: ACTIVE | Noted: 2019-07-16

## 2019-08-07 PROBLEM — M85.80 OSTEOPENIA WITH HIGH RISK OF FRACTURE: Status: ACTIVE | Noted: 2019-08-07

## 2019-09-06 ENCOUNTER — NURSE TRIAGE (OUTPATIENT)
Dept: ADMINISTRATIVE | Facility: CLINIC | Age: 59
End: 2019-09-06
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
## 2019-09-09 NOTE — TELEPHONE ENCOUNTER
Called and spoke with pt to see if she was able to get Arthrotec 75mg filled with [facility]. Pt reports she was unable to get medication due to fixed income, and [employer] did not apply to her since she is on Medicaid. Pt reporting that the swelling did go down in her knee, but she would like to be able to get something to help with pain. Pt asking if pcp can prescribe something else. Informed that pcp will be notified of request. Please advise. Thanks

## 2019-09-09 NOTE — TELEPHONE ENCOUNTER
I'm pretty sure you can still use good RX, you just can't try to use it for same prescription as medicaid.    Otherwise can try sending it to [facility] pharmacy, or long process for PA on medication.

## 2019-09-10 NOTE — TELEPHONE ENCOUNTER
Called and spoke with pt. Informed that arthrotec is $23 plus tax at [facility]. Pt requested to have medication called in to [facility] pharm (done).

## 2019-12-07 ENCOUNTER — HOSPITAL ENCOUNTER (OUTPATIENT)
Facility: HOSPITAL | Age: 59
Discharge: HOME | End: 2019-12-09
Attending: HOSPITALIST | Admitting: HOSPITALIST
Payer: MEDICAID

## 2019-12-07 DIAGNOSIS — M54.12 CERVICAL RADICULOPATHY: ICD-10-CM

## 2019-12-07 DIAGNOSIS — H66.004 RECURRENT ACUTE SUPPURATIVE OTITIS MEDIA OF RIGHT EAR WITHOUT SPONTANEOUS RUPTURE OF TYMPANIC MEMBRANE: Primary | ICD-10-CM

## 2019-12-07 DIAGNOSIS — A41.9 SEPSIS: ICD-10-CM

## 2019-12-07 PROCEDURE — G0378 HOSPITAL OBSERVATION PER HR: HCPCS

## 2019-12-07 PROCEDURE — G0379 DIRECT REFER HOSPITAL OBSERV: HCPCS

## 2019-12-07 NOTE — PLAN OF CARE
[facility] Patient Flow Center Transfer Acceptance Note    FOR [facility] -  Please call extension [phone] (if nobody answers, this will flip to a beeper, so put in your call back number) upon patient arrival to floor for Hospital Medicine admit team assignment and for additional admit orders for the patient.  Do not page the attending, staff physician associate with the patient on arrival (may not be in-house at the time of arrival).  Rather, always call [phone] to reach the triage physician for orders and team assignment.     Transferring Facility/Hospital: [facility]     Referring Provider/Specialty giving report: Dr. Deloris Long - emergency med    Accepting Physician for admission to hospital: Gabriel Shields MD    Date of acceptance:  12/7/2019   5:44 PM      Patients name: Shreya Bae     Allergies:  Review of patient's allergies indicates:   Allergen Reactions    Ciprofloxacin Hives    Nsaids (non-steroidal anti-inflammatory drug) Other (See Comments)     Sensitive stomach, has had GI bleed.  AVM's.  Only short term use okay.    Phenobarbital Hives    Sulfa (sulfonamide antibiotics) Hives    Tramadol         Reason for transfer:  ENT consultation     Overview/ Report from Physician/Mid-Level Provider:    HPI:   60 y/o with right ear pain and drainage, s/p multiple outpatient abx courses for recurrent otitis media and ambulatory notes indicating that patient has been reporting purulent drainage that has not resolved despite recent outpatient anditbiotic courses.  A referral for ENT had been placed after 12/01 appt but patient has not been able to be seen yet.     Today she complains in the ED of Right ear pain, low grade fever, cough,   -Clinic notes indicate patient followed for recurrent acute suppurative otitis media with spontaneous TM rupture.  On 12/01 Rx for cefdinir and clindamycin.    -Also notes in November indicate patient has been treated also with amoxicillin, doxycycline then a  course of augmentin, report of patient stating purulent drainage from left ear.      Blood cultures - zosyn, vancomycin - azithro  30cc/kg fluids     Med Hx - Autoimmune disease - Eosinophilic Myositis, Scleromysedema, Bipolar 1D/o, bipolar d/o, GERD, DVT, Hx of GI Bleed, Neuromuscula d/o, Hypothyroidism  Surg Hx - prior cervical spine surgery, prior T-L spinal surgery,fusion,  s/p IVC filter    Exam - right ear retracted, TM abnormal looking, tender over         VS: Temp:  [99.1 °F (37.3 °C)]   Pulse:  [71-87]   Resp:  [15-20]   BP: (153-178)/(76-91)   SpO2:  [97 %-98 %]     Labs:   Lab Results   Component Value Date    WBC 14.97 (H) 12/07/2019    HGB 12.7 12/07/2019    HCT 38.5 12/07/2019    MCV 85 12/07/2019     (H) 12/07/2019       CMP  Sodium   Date Value Ref Range Status   12/07/2019 141 136 - 145 mmol/L Final     Potassium   Date Value Ref Range Status   12/07/2019 4.4 3.5 - 5.1 mmol/L Final     Chloride   Date Value Ref Range Status   12/07/2019 106 95 - 110 mmol/L Final     CO2   Date Value Ref Range Status   12/07/2019 24 23 - 29 mmol/L Final     Glucose   Date Value Ref Range Status   12/07/2019 90 70 - 110 mg/dL Final     BUN, Bld   Date Value Ref Range Status   12/07/2019 8 8 - 23 mg/dL Final     Creatinine   Date Value Ref Range Status   12/07/2019 0.7 0.5 - 1.4 mg/dL Final     Calcium   Date Value Ref Range Status   12/07/2019 9.5 8.7 - 10.5 mg/dL Final     Total Protein   Date Value Ref Range Status   12/07/2019 8.2 6.0 - 8.4 g/dL Final     Albumin   Date Value Ref Range Status   12/07/2019 4.0 3.5 - 5.2 g/dL Final     Total Bilirubin   Date Value Ref Range Status   12/07/2019 0.4 0.1 - 1.0 mg/dL Final     Comment:     For infants and newborns, interpretation of results should be based  on gestational age, weight and in agreement with clinical  observations.  Premature Infant recommended reference ranges:  Up to 24 hours.............<8.0 mg/dL  Up to 48 hours............<12.0 mg/dL  3-5  days..................<15.0 mg/dL  6-29 days.................<15.0 mg/dL       Alkaline Phosphatase   Date Value Ref Range Status   12/07/2019 62 55 - 135 U/L Final     AST   Date Value Ref Range Status   12/07/2019 24 10 - 40 U/L Final     ALT   Date Value Ref Range Status   12/07/2019 15 10 - 44 U/L Final     Anion Gap   Date Value Ref Range Status   12/07/2019 11 8 - 16 mmol/L Final     eGFR if    Date Value Ref Range Status   12/07/2019 >60.0 >60 mL/min/1.73 m^2 Final     eGFR if non    Date Value Ref Range Status   12/07/2019 >60.0 >60 mL/min/1.73 m^2 Final     Comment:     Calculation used to obtain the estimated glomerular filtration  rate (eGFR) is the CKD-EPI equation.            Radiographs:   EXAMINATION:  CT HEAD WITHOUT CONTRAST    CLINICAL HISTORY:  Headache, acute, norm neuro exam;    TECHNIQUE:  Axial images obtained of brain without contrast    COMPARISON:  CT head 04/02/2003.    FINDINGS:  The ventricular system and cortical sulci are normal size.  No intracranial space-occupying mass, mass effect or focal parenchymal abnormality is seen.    Visualized paranasal sinuses are clear.  Opacification of inferior right mastoid air cells.      Impression       Normal CT of brain.    Opacification inferior right mastoid air cells.      Electronically signed by: Feli Garnett MD  Date: 12/07/2019         To Do List upon arrival:    1.  ENT recommends CT Temporal Bone with thin cuts - non-contrast study.  Did discuss case with Dr. Ortega and he reviewed CT head, he does not note cortical erosion that would give concern for mastoiditis, he did mention evaluating for additional source of infection.    -Patient had Blood cultures drawn and started on vanc/Zosyn azithromycin   _leukocytosis, mild Lactic acid elevation and s/p 30cc/kg bolus   -Monitor Repeat Lactic Acid    -CXR per my interpretation appears w/o acute focal consolidation   -Additional source coud be a neck  infection consider CT Neck with contrast at same time  as above study to r/o parapharyngeal space infection    2. Send Repsiratory Infeciton Pane, UA reflex culture, Sputum culture if pt with productive cough  3.  Consult ENT            Gabriel Shields M.D.  Attending Physician  [facility] Medicine Dept.  Pager: [phone]

## 2019-12-08 PROBLEM — H66.41 RECURRENT SUPPURATIVE OTITIS MEDIA OF RIGHT EAR: Status: ACTIVE | Noted: 2019-12-08

## 2019-12-08 LAB
ADENOVIRUS: NOT DETECTED
ALBUMIN SERPL BCP-MCNC: 3 G/DL (ref 3.5–5.2)
ALP SERPL-CCNC: 46 U/L (ref 55–135)
ALT SERPL W/O P-5'-P-CCNC: 13 U/L (ref 10–44)
ANION GAP SERPL CALC-SCNC: 6 MMOL/L (ref 8–16)
AST SERPL-CCNC: 20 U/L (ref 10–40)
BACTERIA #/AREA URNS AUTO: NORMAL /HPF
BASOPHILS # BLD AUTO: 0.07 K/UL (ref 0–0.2)
BASOPHILS NFR BLD: 0.8 % (ref 0–1.9)
BILIRUB SERPL-MCNC: 0.2 MG/DL (ref 0.1–1)
BILIRUB UR QL STRIP: NEGATIVE
BORDETELLA PARAPERTUSSIS (IS1001): NOT DETECTED
BORDETELLA PERTUSSIS (PTXP): NOT DETECTED
BUN SERPL-MCNC: 5 MG/DL (ref 8–23)
CALCIUM SERPL-MCNC: 8.2 MG/DL (ref 8.7–10.5)
CHLAMYDIA PNEUMONIAE: NOT DETECTED
CHLORIDE SERPL-SCNC: 110 MMOL/L (ref 95–110)
CLARITY UR REFRACT.AUTO: CLEAR
CO2 SERPL-SCNC: 25 MMOL/L (ref 23–29)
COLOR UR AUTO: YELLOW
CORONAVIRUS 229E, COMMON COLD VIRUS: NOT DETECTED
CORONAVIRUS HKU1, COMMON COLD VIRUS: NOT DETECTED
CORONAVIRUS NL63, COMMON COLD VIRUS: NOT DETECTED
CORONAVIRUS OC43, COMMON COLD VIRUS: NOT DETECTED
CREAT SERPL-MCNC: 0.7 MG/DL (ref 0.5–1.4)
DIFFERENTIAL METHOD: ABNORMAL
EOSINOPHIL # BLD AUTO: 0.3 K/UL (ref 0–0.5)
EOSINOPHIL NFR BLD: 3.3 % (ref 0–8)
ERYTHROCYTE [DISTWIDTH] IN BLOOD BY AUTOMATED COUNT: 18.7 % (ref 11.5–14.5)
EST. GFR  (AFRICAN AMERICAN): >60 ML/MIN/1.73 M^2
EST. GFR  (NON AFRICAN AMERICAN): >60 ML/MIN/1.73 M^2
FLUBV RNA NPH QL NAA+NON-PROBE: NOT DETECTED
GLUCOSE SERPL-MCNC: 124 MG/DL (ref 70–110)
GLUCOSE UR QL STRIP: NEGATIVE
HCT VFR BLD AUTO: 33.9 % (ref 37–48.5)
HGB BLD-MCNC: 10.3 G/DL (ref 12–16)
HGB UR QL STRIP: NEGATIVE
HPIV1 RNA NPH QL NAA+NON-PROBE: NOT DETECTED
HPIV2 RNA NPH QL NAA+NON-PROBE: NOT DETECTED
HPIV3 RNA NPH QL NAA+NON-PROBE: NOT DETECTED
HPIV4 RNA NPH QL NAA+NON-PROBE: NOT DETECTED
HUMAN METAPNEUMOVIRUS: NOT DETECTED
IMM GRANULOCYTES # BLD AUTO: 0.03 K/UL (ref 0–0.04)
IMM GRANULOCYTES NFR BLD AUTO: 0.4 % (ref 0–0.5)
INFLUENZA A (SUBTYPES H1,H1-2009,H3): NOT DETECTED
KETONES UR QL STRIP: NEGATIVE
LACTATE SERPL-SCNC: 1 MMOL/L (ref 0.5–2.2)
LEUKOCYTE ESTERASE UR QL STRIP: ABNORMAL
LYMPHOCYTES # BLD AUTO: 2.5 K/UL (ref 1–4.8)
LYMPHOCYTES NFR BLD: 29.7 % (ref 18–48)
MAGNESIUM SERPL-MCNC: 2 MG/DL (ref 1.6–2.6)
MCH RBC QN AUTO: 27.5 PG (ref 27–31)
MCHC RBC AUTO-ENTMCNC: 30.4 G/DL (ref 32–36)
MCV RBC AUTO: 90 FL (ref 82–98)
MICROSCOPIC COMMENT: NORMAL
MONOCYTES # BLD AUTO: 1.3 K/UL (ref 0.3–1)
MONOCYTES NFR BLD: 14.7 % (ref 4–15)
MYCOPLASMA PNEUMONIAE: NOT DETECTED
NEUTROPHILS # BLD AUTO: 4.4 K/UL (ref 1.8–7.7)
NEUTROPHILS NFR BLD: 51.1 % (ref 38–73)
NITRITE UR QL STRIP: NEGATIVE
NRBC BLD-RTO: 0 /100 WBC
PH UR STRIP: 6 [PH] (ref 5–8)
PHOSPHATE SERPL-MCNC: 3.9 MG/DL (ref 2.7–4.5)
PLATELET # BLD AUTO: 411 K/UL (ref 150–350)
PMV BLD AUTO: 10 FL (ref 9.2–12.9)
POTASSIUM SERPL-SCNC: 3.9 MMOL/L (ref 3.5–5.1)
PROT SERPL-MCNC: 6.2 G/DL (ref 6–8.4)
PROT UR QL STRIP: NEGATIVE
RBC # BLD AUTO: 3.75 M/UL (ref 4–5.4)
RBC #/AREA URNS AUTO: 1 /HPF (ref 0–4)
RESPIRATORY INFECTION PANEL SOURCE: NORMAL
RSV RNA NPH QL NAA+NON-PROBE: NOT DETECTED
RV+EV RNA NPH QL NAA+NON-PROBE: NOT DETECTED
SODIUM SERPL-SCNC: 141 MMOL/L (ref 136–145)
SP GR UR STRIP: 1.01 (ref 1–1.03)
SQUAMOUS #/AREA URNS AUTO: 1 /HPF
URN SPEC COLLECT METH UR: ABNORMAL
WBC # BLD AUTO: 8.53 K/UL (ref 3.9–12.7)
WBC #/AREA URNS AUTO: 2 /HPF (ref 0–5)

## 2019-12-08 PROCEDURE — 36415 COLL VENOUS BLD VENIPUNCTURE: CPT

## 2019-12-08 PROCEDURE — 81001 URINALYSIS AUTO W/SCOPE: CPT

## 2019-12-08 PROCEDURE — 25500020 PHARM REV CODE 255: Performed by: HOSPITALIST

## 2019-12-08 PROCEDURE — 80053 COMPREHEN METABOLIC PANEL: CPT

## 2019-12-08 PROCEDURE — 87070 CULTURE OTHR SPECIMN AEROBIC: CPT

## 2019-12-08 PROCEDURE — G0378 HOSPITAL OBSERVATION PER HR: HCPCS

## 2019-12-08 PROCEDURE — 96376 TX/PRO/DX INJ SAME DRUG ADON: CPT

## 2019-12-08 PROCEDURE — 99220 PR INITIAL OBSERVATION CARE,LEVL III: CPT | Mod: ,,, | Performed by: HOSPITALIST

## 2019-12-08 PROCEDURE — 87205 SMEAR GRAM STAIN: CPT

## 2019-12-08 PROCEDURE — 96374 THER/PROPH/DIAG INJ IV PUSH: CPT | Mod: 59

## 2019-12-08 PROCEDURE — 63600175 PHARM REV CODE 636 W HCPCS: Performed by: STUDENT IN AN ORGANIZED HEALTH CARE EDUCATION/TRAINING PROGRAM

## 2019-12-08 PROCEDURE — 96375 TX/PRO/DX INJ NEW DRUG ADDON: CPT

## 2019-12-08 PROCEDURE — 99220 PR INITIAL OBSERVATION CARE,LEVL III: ICD-10-PCS | Mod: ,,, | Performed by: HOSPITALIST

## 2019-12-08 PROCEDURE — 25000003 PHARM REV CODE 250: Performed by: STUDENT IN AN ORGANIZED HEALTH CARE EDUCATION/TRAINING PROGRAM

## 2019-12-08 PROCEDURE — 83735 ASSAY OF MAGNESIUM: CPT

## 2019-12-08 PROCEDURE — 87633 RESP VIRUS 12-25 TARGETS: CPT

## 2019-12-08 PROCEDURE — 84100 ASSAY OF PHOSPHORUS: CPT

## 2019-12-08 PROCEDURE — 83605 ASSAY OF LACTIC ACID: CPT

## 2019-12-08 PROCEDURE — 85025 COMPLETE CBC W/AUTO DIFF WBC: CPT

## 2019-12-08 RX ORDER — AZITHROMYCIN 250 MG/1
500 TABLET, FILM COATED ORAL DAILY
Status: DISCONTINUED | OUTPATIENT
Start: 2019-12-08 | End: 2019-12-08

## 2019-12-08 RX ORDER — IBUPROFEN 200 MG
24 TABLET ORAL
Status: DISCONTINUED | OUTPATIENT
Start: 2019-12-08 | End: 2019-12-09 | Stop reason: HOSPADM

## 2019-12-08 RX ORDER — DEXTROSE MONOHYDRATE 100 MG/ML
25 INJECTION, SOLUTION INTRAVENOUS
Status: DISCONTINUED | OUTPATIENT
Start: 2019-12-08 | End: 2019-12-09 | Stop reason: HOSPADM

## 2019-12-08 RX ORDER — OXYCODONE HYDROCHLORIDE 5 MG/1
5 TABLET ORAL EVERY 6 HOURS PRN
Status: DISCONTINUED | OUTPATIENT
Start: 2019-12-08 | End: 2019-12-09 | Stop reason: HOSPADM

## 2019-12-08 RX ORDER — ACETAMINOPHEN 500 MG
500 TABLET ORAL EVERY 8 HOURS
Status: DISCONTINUED | OUTPATIENT
Start: 2019-12-08 | End: 2019-12-09 | Stop reason: HOSPADM

## 2019-12-08 RX ORDER — METHOCARBAMOL 750 MG/1
750 TABLET, FILM COATED ORAL 3 TIMES DAILY PRN
Status: DISCONTINUED | OUTPATIENT
Start: 2019-12-08 | End: 2019-12-08

## 2019-12-08 RX ORDER — ALBUTEROL SULFATE 90 UG/1
2 AEROSOL, METERED RESPIRATORY (INHALATION)
Status: ON HOLD | COMMUNITY
End: 2019-12-19

## 2019-12-08 RX ORDER — ONDANSETRON 2 MG/ML
4 INJECTION INTRAMUSCULAR; INTRAVENOUS EVERY 8 HOURS PRN
Status: DISCONTINUED | OUTPATIENT
Start: 2019-12-08 | End: 2019-12-09 | Stop reason: HOSPADM

## 2019-12-08 RX ORDER — LITHIUM CARBONATE 300 MG/1
1200 CAPSULE ORAL NIGHTLY
Status: DISCONTINUED | OUTPATIENT
Start: 2019-12-08 | End: 2019-12-09 | Stop reason: HOSPADM

## 2019-12-08 RX ORDER — BENZONATATE 100 MG/1
100 CAPSULE ORAL 3 TIMES DAILY PRN
Status: DISCONTINUED | OUTPATIENT
Start: 2019-12-08 | End: 2019-12-08

## 2019-12-08 RX ORDER — OFLOXACIN 3 MG/ML
1 SOLUTION/ DROPS OPHTHALMIC 4 TIMES DAILY
Refills: 0 | Status: ON HOLD | COMMUNITY
Start: 2019-12-04 | End: 2019-12-09 | Stop reason: SDUPTHER

## 2019-12-08 RX ORDER — NITROFURANTOIN 25; 75 MG/1; MG/1
100 CAPSULE ORAL 2 TIMES DAILY
Status: ON HOLD | COMMUNITY
End: 2019-12-09 | Stop reason: HOSPADM

## 2019-12-08 RX ORDER — CEFEPIME HYDROCHLORIDE 1 G/1
1 INJECTION, POWDER, FOR SOLUTION INTRAMUSCULAR; INTRAVENOUS
Status: DISCONTINUED | OUTPATIENT
Start: 2019-12-08 | End: 2019-12-09

## 2019-12-08 RX ORDER — VANCOMYCIN HCL IN 5 % DEXTROSE 1G/250ML
1000 PLASTIC BAG, INJECTION (ML) INTRAVENOUS
Status: DISCONTINUED | OUTPATIENT
Start: 2019-12-08 | End: 2019-12-09

## 2019-12-08 RX ORDER — OFLOXACIN 3 MG/ML
1 SOLUTION/ DROPS OPHTHALMIC 4 TIMES DAILY
Status: DISCONTINUED | OUTPATIENT
Start: 2019-12-08 | End: 2019-12-09 | Stop reason: HOSPADM

## 2019-12-08 RX ORDER — TRAZODONE HYDROCHLORIDE 100 MG/1
200 TABLET ORAL NIGHTLY
Status: DISCONTINUED | OUTPATIENT
Start: 2019-12-08 | End: 2019-12-09 | Stop reason: HOSPADM

## 2019-12-08 RX ORDER — SODIUM CHLORIDE 0.9 % (FLUSH) 0.9 %
10 SYRINGE (ML) INJECTION
Status: DISCONTINUED | OUTPATIENT
Start: 2019-12-08 | End: 2019-12-09 | Stop reason: HOSPADM

## 2019-12-08 RX ORDER — OFLOXACIN 3 MG/ML
5 SOLUTION AURICULAR (OTIC) 2 TIMES DAILY
Status: DISCONTINUED | OUTPATIENT
Start: 2019-12-08 | End: 2019-12-09 | Stop reason: HOSPADM

## 2019-12-08 RX ORDER — FERROUS SULFATE 325(65) MG
325 TABLET, DELAYED RELEASE (ENTERIC COATED) ORAL DAILY
Status: DISCONTINUED | OUTPATIENT
Start: 2019-12-08 | End: 2019-12-09 | Stop reason: HOSPADM

## 2019-12-08 RX ORDER — ACETAMINOPHEN 325 MG/1
650 TABLET ORAL EVERY 6 HOURS PRN
Status: DISCONTINUED | OUTPATIENT
Start: 2019-12-08 | End: 2019-12-08

## 2019-12-08 RX ORDER — GLUCAGON 1 MG
1 KIT INJECTION
Status: DISCONTINUED | OUTPATIENT
Start: 2019-12-08 | End: 2019-12-09 | Stop reason: HOSPADM

## 2019-12-08 RX ORDER — METHOCARBAMOL 750 MG/1
750 TABLET, FILM COATED ORAL 3 TIMES DAILY PRN
Status: DISCONTINUED | OUTPATIENT
Start: 2019-12-08 | End: 2019-12-09 | Stop reason: HOSPADM

## 2019-12-08 RX ORDER — AMITRIPTYLINE HYDROCHLORIDE 25 MG/1
50 TABLET, FILM COATED ORAL NIGHTLY
Status: DISCONTINUED | OUTPATIENT
Start: 2019-12-08 | End: 2019-12-09 | Stop reason: HOSPADM

## 2019-12-08 RX ORDER — LITHIUM CARBONATE 300 MG/1
600 CAPSULE ORAL 2 TIMES DAILY
Status: DISCONTINUED | OUTPATIENT
Start: 2019-12-08 | End: 2019-12-08

## 2019-12-08 RX ORDER — BENZONATATE 100 MG/1
200 CAPSULE ORAL 3 TIMES DAILY PRN
Status: DISCONTINUED | OUTPATIENT
Start: 2019-12-08 | End: 2019-12-09 | Stop reason: HOSPADM

## 2019-12-08 RX ORDER — LIDOCAINE 50 MG/G
1 PATCH TOPICAL
Status: DISCONTINUED | OUTPATIENT
Start: 2019-12-08 | End: 2019-12-09 | Stop reason: HOSPADM

## 2019-12-08 RX ORDER — IBUPROFEN 200 MG
16 TABLET ORAL
Status: DISCONTINUED | OUTPATIENT
Start: 2019-12-08 | End: 2019-12-09 | Stop reason: HOSPADM

## 2019-12-08 RX ORDER — GUAIFENESIN/DEXTROMETHORPHAN 100-10MG/5
5 SYRUP ORAL EVERY 4 HOURS PRN
Status: DISCONTINUED | OUTPATIENT
Start: 2019-12-08 | End: 2019-12-08

## 2019-12-08 RX ORDER — DEXTROSE MONOHYDRATE 100 MG/ML
12.5 INJECTION, SOLUTION INTRAVENOUS
Status: DISCONTINUED | OUTPATIENT
Start: 2019-12-08 | End: 2019-12-09 | Stop reason: HOSPADM

## 2019-12-08 RX ORDER — GABAPENTIN 300 MG/1
600 CAPSULE ORAL 3 TIMES DAILY
Status: DISCONTINUED | OUTPATIENT
Start: 2019-12-08 | End: 2019-12-09 | Stop reason: HOSPADM

## 2019-12-08 RX ORDER — ENOXAPARIN SODIUM 100 MG/ML
40 INJECTION SUBCUTANEOUS EVERY 24 HOURS
Status: DISCONTINUED | OUTPATIENT
Start: 2019-12-08 | End: 2019-12-08

## 2019-12-08 RX ADMIN — BENZONATATE 100 MG: 100 CAPSULE ORAL at 02:12

## 2019-12-08 RX ADMIN — OFLOXACIN 1 DROP: 3 SOLUTION/ DROPS OPHTHALMIC at 09:12

## 2019-12-08 RX ADMIN — GABAPENTIN 600 MG: 300 CAPSULE ORAL at 02:12

## 2019-12-08 RX ADMIN — ACETAMINOPHEN 650 MG: 325 TABLET ORAL at 02:12

## 2019-12-08 RX ADMIN — GUAIFENESIN AND DEXTROMETHORPHAN HYDROBROMIDE 1 TABLET: 600; 30 TABLET, EXTENDED RELEASE ORAL at 09:12

## 2019-12-08 RX ADMIN — AMITRIPTYLINE HYDROCHLORIDE 50 MG: 25 TABLET, FILM COATED ORAL at 12:12

## 2019-12-08 RX ADMIN — AZITHROMYCIN 500 MG: 250 TABLET, FILM COATED ORAL at 08:12

## 2019-12-08 RX ADMIN — ONDANSETRON 4 MG: 2 INJECTION INTRAMUSCULAR; INTRAVENOUS at 08:12

## 2019-12-08 RX ADMIN — AMITRIPTYLINE HYDROCHLORIDE 50 MG: 25 TABLET, FILM COATED ORAL at 09:12

## 2019-12-08 RX ADMIN — BENZONATATE 100 MG: 100 CAPSULE ORAL at 01:12

## 2019-12-08 RX ADMIN — CEFEPIME 1 G: 1 INJECTION, POWDER, FOR SOLUTION INTRAMUSCULAR; INTRAVENOUS at 02:12

## 2019-12-08 RX ADMIN — BENZONATATE 200 MG: 100 CAPSULE ORAL at 09:12

## 2019-12-08 RX ADMIN — OFLOXACIN 1 DROP: 3 SOLUTION/ DROPS OPHTHALMIC at 05:12

## 2019-12-08 RX ADMIN — METHOCARBAMOL TABLETS 750 MG: 750 TABLET, COATED ORAL at 09:12

## 2019-12-08 RX ADMIN — GABAPENTIN 600 MG: 300 CAPSULE ORAL at 08:12

## 2019-12-08 RX ADMIN — METHOCARBAMOL TABLETS 750 MG: 750 TABLET, COATED ORAL at 08:12

## 2019-12-08 RX ADMIN — IOHEXOL 75 ML: 350 INJECTION, SOLUTION INTRAVENOUS at 07:12

## 2019-12-08 RX ADMIN — VANCOMYCIN HYDROCHLORIDE 1000 MG: 1 INJECTION, POWDER, LYOPHILIZED, FOR SOLUTION INTRAVENOUS at 04:12

## 2019-12-08 RX ADMIN — TRAZODONE HYDROCHLORIDE 200 MG: 100 TABLET ORAL at 12:12

## 2019-12-08 RX ADMIN — GUAIFENESIN AND DEXTROMETHORPHAN 5 ML: 100; 10 SYRUP ORAL at 04:12

## 2019-12-08 RX ADMIN — LITHIUM CARBONATE 1200 MG: 300 CAPSULE, GELATIN COATED ORAL at 01:12

## 2019-12-08 RX ADMIN — OXYCODONE HYDROCHLORIDE 5 MG: 5 TABLET ORAL at 11:12

## 2019-12-08 RX ADMIN — TRAZODONE HYDROCHLORIDE 200 MG: 100 TABLET ORAL at 09:12

## 2019-12-08 RX ADMIN — OFLOXACIN 1 DROP: 3 SOLUTION/ DROPS OPHTHALMIC at 12:12

## 2019-12-08 RX ADMIN — FERROUS SULFATE TAB EC 325 MG (65 MG FE EQUIVALENT) 325 MG: 325 (65 FE) TABLET DELAYED RESPONSE at 08:12

## 2019-12-08 RX ADMIN — OXYCODONE HYDROCHLORIDE 5 MG: 5 TABLET ORAL at 05:12

## 2019-12-08 RX ADMIN — LIDOCAINE 1 PATCH: 50 PATCH TOPICAL at 05:12

## 2019-12-08 RX ADMIN — CEFEPIME 1 G: 1 INJECTION, POWDER, FOR SOLUTION INTRAMUSCULAR; INTRAVENOUS at 04:12

## 2019-12-08 RX ADMIN — GABAPENTIN 600 MG: 300 CAPSULE ORAL at 12:12

## 2019-12-08 RX ADMIN — VANCOMYCIN HYDROCHLORIDE 1000 MG: 1 INJECTION, POWDER, LYOPHILIZED, FOR SOLUTION INTRAVENOUS at 07:12

## 2019-12-08 RX ADMIN — GABAPENTIN 600 MG: 300 CAPSULE ORAL at 09:12

## 2019-12-08 RX ADMIN — ACETAMINOPHEN 500 MG: 500 TABLET ORAL at 09:12

## 2019-12-08 RX ADMIN — OFLOXACIN 5 DROP: 3 SOLUTION AURICULAR (OTIC) at 12:12

## 2019-12-08 RX ADMIN — LITHIUM CARBONATE 1200 MG: 300 CAPSULE, GELATIN COATED ORAL at 09:12

## 2019-12-08 RX ADMIN — OFLOXACIN 5 DROP: 3 SOLUTION AURICULAR (OTIC) at 09:12

## 2019-12-08 NOTE — HPI
This is a 59 year old female with an 8 week history of right-sided otalgia and otorrhea. Patient states she developed URI type symptoms about 8 weeks ago, and simultaneously developed right sided ear pain. Reports having been seen by PCP and UC on multiple occasions for otalgia over past 8 weeks, and has been given 5-6 different antibiotic courses. She states she continues to have muffled hearing and otalgia on the right, but states otorrhea has resolved. No problems reported with the left ear. She also describes post-auricular pain that tracks into her neck. Continues to have productive cough. Patient with a history of PE tubes as an adult, with a set placed about 20 years ago. Further describes exudate from the eyes that began about 3 weeks ago, treated with abx eye drops and has resolved. Patient afebrile. Imaging obtained at outside hospital demonstrated mastoid effusion and patient was transferred here for further management. ENT has been consulted for evaluation.

## 2019-12-08 NOTE — H&P
[facility] Hospital Medicine  History & Physical    Patient Name: Shreya Bae  MRN: [id number]  Admission Date: 12/7/2019  Attending Physician: Grace Jason MD   Primary Care Provider: Dean Vidales MD    [facility] Medicine Team: [facility] 4 Angel Jarvis DO     Patient information was obtained from patient and past medical records.     Subjective:     Principal Problem:Recurrent suppurative otitis media of right ear    Chief Complaint: No chief complaint on file.       HPI: MS. Bae is a 60 yo F with right ear pain and drainage, s/p multiple outpatient abx courses for recurrent otitis media and ambulatory notes indicating that patient has been reporting purulent drainage that has not resolved despite recent outpatient anditbiotic courses.   Pt states that her symptoms began seven weeks ago as a right ear ache. She reports that she has had prior ear infections as an adult, previously requiring tympanostomy tubes placed in her 40s. She notes that since her symptoms began she has had pain in her right ear that now has spread to her behind her right ear with radiations down her right neck, as well as pain above and beside her right eye. She also notes that she has had significant exudate from her eyes starting 3 weeks ago that has improved since she started antibiotic drops 12/4. She reports chills and night sweats, as well as being awoken by pain around her eye that improved with repositioning in bed. She has also noted a significant cough for the past several weeks; this cough seems to come in fits and sometimes is so intense as to cause her to gag and vomit. Pt notes she has had pertussis a few years ago and was worried that this felt similar. Pt was referred to ENT after her 12/01 appt but patient has not been able to be seen yet.            Past Medical History:   Diagnosis Date    Autoimmune disease     Autoimmune hepatitis     Bipolar 1 disorder     Depression     DVT  (deep vein thrombosis) in pregnancy     GERD (gastroesophageal reflux disease)     History of GI bleed     Insomnia     Neuromuscular disorder     PE (pulmonary thromboembolism)     Scleromyxedema     Thyroid disease        Past Surgical History:   Procedure Laterality Date    ABDOMINAL SURGERY      APPENDECTOMY      BACK SURGERY      CARPAL TUNNEL RELEASE Bilateral     CERVICAL FUSION       SECTION      CHOLECYSTECTOMY      GASTRIC BYPASS      HYSTERECTOMY      LUMBAR FUSION      NECK SURGERY      spleenectomy      THYROID CYST EXCISION  1982    TONSILLECTOMY         Review of patient's allergies indicates:   Allergen Reactions    Ciprofloxacin Hives    Nsaids (non-steroidal anti-inflammatory drug) Other (See Comments)     Sensitive stomach, has had GI bleed.  AVM's.  Only short term use okay.    Phenobarbital Hives    Sulfa (sulfonamide antibiotics) Hives    Tramadol        Current Facility-Administered Medications on File Prior to Encounter   Medication    [COMPLETED] 0.9%  NaCl infusion    [COMPLETED] 0.9%  NaCl infusion    [COMPLETED] 0.9%  NaCl infusion    [COMPLETED] azithromycin 500 mg in dextrose 5 % 250 mL IVPB (ready to mix system)    [COMPLETED] benzonatate capsule 100 mg    [COMPLETED] morphine injection 6 mg    [COMPLETED] morphine injection 6 mg    [COMPLETED] ondansetron injection 4 mg    [COMPLETED] piperacillin-tazobactam 4.5 g in dextrose 5 % 100 mL IVPB (ready to mix system)    [COMPLETED] vancomycin 2 g in dextrose 5 % 500 mL IVPB     Current Outpatient Medications on File Prior to Encounter   Medication Sig    alendronate (FOSAMAX) 70 MG tablet Take 1 tablet (70 mg total) by mouth every 7 days.    amitriptyline (ELAVIL) 50 MG tablet Take 50 mg by mouth nightly.    clindamycin (CLEOCIN) 300 MG capsule Take 1 capsule (300 mg total) by mouth every 8 (eight) hours.    ferrous sulfate 325 mg (65 mg iron) Tab tablet Take 1 tablet (325 mg total) by  mouth daily with breakfast.    gabapentin (NEURONTIN) 600 MG tablet Take 1 tablet (600 mg total) by mouth 3 (three) times daily.    levothyroxine (SYNTHROID) 75 MCG tablet Take 1 tablet (75 mcg total) by mouth once daily.    lithium 600 MG capsule Take 600 mg by mouth 2 (two) times daily.    meclizine (ANTIVERT) 25 mg tablet Take 1 tablet (25 mg total) by mouth 3 (three) times daily as needed for Dizziness.    methocarbamol (ROBAXIN) 750 MG Tab Take 1 tablet (750 mg total) by mouth 3 (three) times daily.    ofloxacin (OCUFLOX) 0.3 % ophthalmic solution Place 1 drop into both eyes 4 (four) times daily.    pantoprazole (PROTONIX) 40 MG tablet     trazodone (DESYREL) 100 MG tablet Take 200 mg by mouth every evening.     XIIDRA 5 % Dpet PLACE 1 GTT OU BID FOR DRY EYES    [DISCONTINUED] esomeprazole (NEXIUM) 40 MG capsule Take 40 mg by mouth.     Family History     Problem Relation (Age of Onset)    Breast cancer Mother, Sister, Maternal Grandmother, Paternal Grandmother    Colon cancer Mother (70), Father (55)        Tobacco Use    Smoking status: Former Smoker     Packs/day: 0.50     Years: 40.00     Pack years: 20.00     Types: Cigarettes     Last attempt to quit: 3/19/2019     Years since quittin.7    Smokeless tobacco: Never Used   Substance and Sexual Activity    Alcohol use: Yes     Comment: Socially    Drug use: No    Sexual activity: Not on file     Review of Systems   Constitutional: Positive for chills and diaphoresis.   HENT: Positive for ear discharge, ear pain and sinus pain.    Eyes: Positive for pain, discharge and redness.   Respiratory: Positive for shortness of breath.    Cardiovascular: Positive for chest pain (lower ribs, worse with coughing). Negative for palpitations and leg swelling.   Gastrointestinal: Positive for vomiting. Negative for abdominal distention and abdominal pain.   Genitourinary: Positive for difficulty urinating (history of neurogenic bladder). Negative for  dysuria.   Musculoskeletal: Positive for back pain and neck pain.   Skin: Negative for rash and wound.   Neurological: Positive for headaches.     Objective:     Vital Signs (Most Recent):  Temp: 99.1 °F (37.3 °C) (12/07/19 2224)  Pulse: 76 (12/07/19 2224)  Resp: 18 (12/07/19 2224)  BP: (!) 157/74 (12/07/19 2224)  SpO2: (!) 93 % (12/07/19 2224) Vital Signs (24h Range):  Temp:  [99.1 °F (37.3 °C)] 99.1 °F (37.3 °C)  Pulse:  [66-87] 76  Resp:  [15-20] 18  SpO2:  [93 %-98 %] 93 %  BP: (140-178)/(67-91) 157/74     Weight: 73 kg (160 lb 15 oz)  Body mass index is 25.21 kg/m².    Physical Exam   Constitutional: She is oriented to person, place, and time. She appears well-developed and well-nourished. She is active and cooperative. She does not appear ill. No distress.   HENT:   Right Ear: No drainage, swelling or tenderness. There is mastoid tenderness. Tympanic membrane is scarred. No middle ear effusion.   Left Ear: No drainage, swelling or tenderness. No mastoid tenderness. Tympanic membrane is scarred.  No middle ear effusion.   Eyes: Pupils are equal, round, and reactive to light. Conjunctivae and EOM are normal. Right eye exhibits no discharge. Left eye exhibits no discharge.   No pain on extra-occular muscle use   Neck: Normal range of motion. Neck supple. No JVD present. Muscular tenderness present. No neck rigidity. No tracheal deviation, no edema and no erythema present. No thyromegaly present.   No fluctuance or increased warmth noted along painful area of right neck   Cardiovascular: Normal rate, regular rhythm and normal heart sounds.   Pulmonary/Chest: Effort normal and breath sounds normal. No respiratory distress.   Abdominal: Soft. Bowel sounds are normal. She exhibits no distension. There is no tenderness.   Musculoskeletal: Normal range of motion. She exhibits no edema, tenderness or deformity.   Lymphadenopathy:     She has no cervical adenopathy.   Neurological: She is alert and oriented to person,  place, and time.   Skin: Skin is warm and dry. No rash noted. She is not diaphoretic. No erythema.   Psychiatric: She has a normal mood and affect. Her behavior is normal. Thought content normal. Her speech is not rapid and/or pressured and not tangential.   Circumstantial speech         CRANIAL NERVES     CN III, IV, VI   Pupils are equal, round, and reactive to light.  Extraocular motions are normal.        Significant Labs:   Blood Culture: No results for input(s): LABBLOO in the last 48 hours.  CMP:   Recent Labs   Lab 12/07/19  1425      K 4.4      CO2 24   GLU 90   BUN 8   CREATININE 0.7   CALCIUM 9.5   PROT 8.2   ALBUMIN 4.0   BILITOT 0.4   ALKPHOS 62   AST 24   ALT 15   ANIONGAP 11   EGFRNONAA >60.0     Lactic Acid:   Recent Labs   Lab 12/08/19  0109   LACTATE 1.0     Urine Studies:   Recent Labs   Lab 12/07/19  1736   COLORU Yellow   APPEARANCEUA Clear   PHUR 6.0   SPECGRAV 1.005   PROTEINUA Negative   GLUCUA Negative   KETONESU Negative   BILIRUBINUA Negative   OCCULTUA Negative   NITRITE Negative   LEUKOCYTESUR Negative     All pertinent labs within the past 24 hours have been reviewed.    Significant Imaging: I have reviewed all pertinent imaging results/findings within the past 24 hours.    Assessment/Plan:     * Recurrent suppurative otitis media of right ear  -Clinic notes indicate patient followed for recurrent acute suppurative otitis media with spontaneous TM rupture.  On 12/01 Rx for cefdinir and clindamycin.    -Also notes in November indicate patient has been treated also with amoxicillin, doxycycline then a course of augmentin, report of patient stating purulent drainage from left ear.        Sepsis  Pt with leukocytosis and Lactic acid of 1.51 on presentation to [facility] ED. Pt vitally stable and in no apparent distress on [place] [facility]   -Presumed secondary to otitis media   -Patient had Blood cultures drawn and started on vanc/Zosyn azithromycin; s/p 30cc/kg bolus. Will continue  broad antibiotics pending culture data.   -Repeat lactic acid 1.0, no longer trending.   -CXR 12/7: Stable chest, no acute disease identified   - Transferred for concern for mastoiditis; will obtain CT Temporal Bone with thin cuts non-contrast.   Case discussed with Dr. Ortega prior to transfer; he does not note cortical erosion that would give concern for mastoiditis on CT head at OSH  -Additional source coud be a neck infection, though no pocket of fluctuance or retropharyngeal bulging seen on exam. 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

## 2019-12-08 NOTE — CONSULTS
[facility]  Otorhinolaryngology-Head & Neck Surgery  Consult Note    Patient Name: Shreya Bae  MRN: [id number]  Code Status: Full Code  Admission Date: 12/7/2019  Hospital Length of Stay: 0 days  Attending Physician: Grace Jason MD  Primary Care Provider: Dean Vidales MD    Patient information was obtained from patient, past medical records and ER records.     Inpatient consult to ENT  Consult performed by: Bradley Brown MD  Consult ordered by: Angel Jarvis DO        Subjective:     Chief Complaint/Reason for Admission: Concern for mastoiditis    History of Present Illness: This is a 59 year old female with an 8 week history of right-sided otalgia and otorrhea. Patient states she developed URI type symptoms about 8 weeks ago, and simultaneously developed right sided ear pain. Reports having been seen by PCP and UC on multiple occasions for otalgia over past 8 weeks, and has been given 5-6 different antibiotic courses. She states she continues to have muffled hearing and otalgia on the right, but states otorrhea has resolved. No problems reported with the left ear. She also describes post-auricular pain that tracks into her neck. Continues to have productive cough. Patient with a history of PE tubes as an adult, with a set placed about 20 years ago. Further describes exudate from the eyes that began about 3 weeks ago, treated with abx eye drops and has resolved. Patient afebrile. Imaging obtained at outside hospital demonstrated mastoid effusion and patient was transferred here for further management. ENT has been consulted for evaluation.    Medications:  Continuous Infusions:  Scheduled Meds:   amitriptyline  50 mg Oral Nightly    ceFEPime (MAXIPIME) IVPB  1 g Intravenous Q12H    ferrous sulfate  325 mg Oral Daily    gabapentin  600 mg Oral TID    levothyroxine  75 mcg Oral Daily    lithium  1,200 mg Oral QHS    ofloxacin  1 drop Both Eyes QID    traZODone   200 mg Oral QHS    vancomycin (VANCOCIN) IVPB  1,000 mg Intravenous Q12H     PRN Meds:benzonatate, dextromethorphan-guaifenesin  mg/5 ml, dextrose 10 % in water (D10W), dextrose 10 % in water (D10W), glucagon (human recombinant), glucose, glucose, methocarbamol, sodium chloride 0.9%     Current Facility-Administered Medications on File Prior to Encounter   Medication    [COMPLETED] 0.9%  NaCl infusion    [COMPLETED] 0.9%  NaCl infusion    [COMPLETED] 0.9%  NaCl infusion    [COMPLETED] azithromycin 500 mg in dextrose 5 % 250 mL IVPB (ready to mix system)    [COMPLETED] benzonatate capsule 100 mg    [COMPLETED] morphine injection 6 mg    [COMPLETED] morphine injection 6 mg    [COMPLETED] ondansetron injection 4 mg    [COMPLETED] piperacillin-tazobactam 4.5 g in dextrose 5 % 100 mL IVPB (ready to mix system)    [COMPLETED] vancomycin 2 g in dextrose 5 % 500 mL IVPB     Current Outpatient Medications on File Prior to Encounter   Medication Sig    alendronate (FOSAMAX) 70 MG tablet Take 1 tablet (70 mg total) by mouth every 7 days.    amitriptyline (ELAVIL) 50 MG tablet Take 50 mg by mouth nightly.    clindamycin (CLEOCIN) 300 MG capsule Take 1 capsule (300 mg total) by mouth every 8 (eight) hours.    ferrous sulfate 325 mg (65 mg iron) Tab tablet Take 1 tablet (325 mg total) by mouth daily with breakfast.    gabapentin (NEURONTIN) 600 MG tablet Take 1 tablet (600 mg total) by mouth 3 (three) times daily.    levothyroxine (SYNTHROID) 75 MCG tablet Take 1 tablet (75 mcg total) by mouth once daily.    lithium 600 MG capsule Take 600 mg by mouth 2 (two) times daily.    meclizine (ANTIVERT) 25 mg tablet Take 1 tablet (25 mg total) by mouth 3 (three) times daily as needed for Dizziness.    methocarbamol (ROBAXIN) 750 MG Tab Take 1 tablet (750 mg total) by mouth 3 (three) times daily.    ofloxacin (OCUFLOX) 0.3 % ophthalmic solution Place 1 drop into both eyes 4 (four) times daily.    pantoprazole  (PROTONIX) 40 MG tablet     trazodone (DESYREL) 100 MG tablet Take 200 mg by mouth every evening.     XIIDRA 5 % Dpet PLACE 1 GTT OU BID FOR DRY EYES    [DISCONTINUED] esomeprazole (NEXIUM) 40 MG capsule Take 40 mg by mouth.       Review of patient's allergies indicates:   Allergen Reactions    Ciprofloxacin Hives    Nsaids (non-steroidal anti-inflammatory drug) Other (See Comments)     Sensitive stomach, has had GI bleed.  AVM's.  Only short term use okay.    Phenobarbital Hives    Sulfa (sulfonamide antibiotics) Hives    Tramadol        Past Medical History:   Diagnosis Date    Autoimmune disease     Autoimmune hepatitis     Bipolar 1 disorder     Depression     DVT (deep vein thrombosis) in pregnancy     GERD (gastroesophageal reflux disease)     History of GI bleed     Insomnia     Neuromuscular disorder     PE (pulmonary thromboembolism)     Scleromyxedema     Thyroid disease      Past Surgical History:   Procedure Laterality Date    ABDOMINAL SURGERY      APPENDECTOMY      BACK SURGERY      CARPAL TUNNEL RELEASE Bilateral     CERVICAL FUSION       SECTION      CHOLECYSTECTOMY      GASTRIC BYPASS      HYSTERECTOMY      LUMBAR FUSION      NECK SURGERY      spleenectomy      THYROID CYST EXCISION  1982    TONSILLECTOMY       Family History     Problem Relation (Age of Onset)    Breast cancer Mother, Sister, Maternal Grandmother, Paternal Grandmother    Colon cancer Mother (70), Father (55)        Tobacco Use    Smoking status: Former Smoker     Packs/day: 0.50     Years: 40.00     Pack years: 20.00     Types: Cigarettes     Last attempt to quit: 3/19/2019     Years since quittin.7    Smokeless tobacco: Never Used   Substance and Sexual Activity    Alcohol use: Yes     Comment: Socially    Drug use: No    Sexual activity: Not on file     Review of Systems   Constitutional: Positive for fever. Negative for appetite change and chills.   HENT: Positive for  congestion, ear discharge, ear pain, hearing loss and sore throat. Negative for facial swelling, trouble swallowing and voice change.    Eyes: Positive for discharge. Negative for photophobia and visual disturbance.   Respiratory: Positive for cough. Negative for shortness of breath, wheezing and stridor.    Cardiovascular: Negative for chest pain and palpitations.   Gastrointestinal: Negative for abdominal pain, nausea and vomiting.   Musculoskeletal: Negative for neck stiffness.   Neurological: Positive for dizziness. Negative for weakness.     Objective:     Vital Signs (Most Recent):  Temp: 97.8 °F (36.6 °C) (12/08/19 0757)  Pulse: 79 (12/08/19 0757)  Resp: 18 (12/08/19 0757)  BP: (!) 123/57 (12/08/19 0757)  SpO2: (!) 94 % (12/08/19 0757) Vital Signs (24h Range):  Temp:  [97.8 °F (36.6 °C)-99.1 °F (37.3 °C)] 97.8 °F (36.6 °C)  Pulse:  [62-87] 79  Resp:  [15-20] 18  SpO2:  [93 %-98 %] 94 %  BP: (102-178)/(55-91) 123/57     Weight: 73 kg (160 lb 15 oz)  Body mass index is 25.21 kg/m².    Physical Exam  Appearance: Awake, alert and oriented. No acute distress.  Eyes: Pupils equal, round and reactive. Extraocular muscles intact. Vision grossly intact. No exudate present bilaterally  Nose: External appearance normal. No evidence of septal deviation. Inferior turbinates within normal limits  Ears:  Right: External appearance normal. External auditory canal within normal limits. Tympanic membrane translucent, some scarring present. Small serous effusion present. There is post-auricular pain on the right tracking down to the base of neck. No fluctuance or induration. Mastoid tip not particularly tender  Left: External appearance normal. External auditory canal within normal limits. Tympanic membrane translucent. No evidence of infection or effusion. No tenderness over the mastoid or left neck  Gonzales non-lateralizing, AC>BC bilaterally  Mouth: Mucosal membranes moist. Tongue mobile. Oropharynx clear and patent. No  erythema or exudate present in posterior OP  Neck: No anterior or posterior cervical lymphadenopathy. Ten  Respiratory: Normal work of breathing. No stridor or stertor    Significant Labs:  CBC:   Recent Labs   Lab 12/08/19  0518   WBC 8.53   RBC 3.75*   HGB 10.3*   HCT 33.9*   *   MCV 90   MCH 27.5   MCHC 30.4*     CMP:   Recent Labs   Lab 12/08/19  0518   *   CALCIUM 8.2*   ALBUMIN 3.0*   PROT 6.2      K 3.9   CO2 25      BUN 5*   CREATININE 0.7   ALKPHOS 46*   ALT 13   AST 20   BILITOT 0.2       Significant Diagnostics:  CT: I have reviewed all pertinent results/findings within the past 24 hours and my personal findings are:  Bilateral mastoid effusions, R>L. No evidence of coalescence or cortical erosion. Scutum sharp. CT Neck without any abnormal findings.    Assessment/Plan:     * Recurrent suppurative otitis media of right ear  59 yoF with 8 weeks of recurrent OM diagnosed by PCP and UC. Treated with multiple courses of abx with no resolution. Patient without otorrhea at present. TMs are clear, with small effusion on the right. Imaging with mastoid effusion, but no coalescence or cortical erosion. No suspicion for acute mastoiditis at this time. CT Neck normal. Leukocytosis downtrending.    -- No role for acute surgical intervention at this time  -- Would continue with medical management, IV abx as per primary team  -- Suspect some of her pain could be musculoskeletal  -- ENT will follow  -- Patient should follow up staff ENT, Dr. Ortega, as an outpatient for audiogram and ear evaluation  -- Seen and evaluated with staff, Dr. Ortega      VTE Risk Mitigation (From admission, onward)         Ordered     IP VTE HIGH RISK PATIENT  Once      12/08/19 0048                Thank you for your consult. I will follow-up with patient. Please contact us if you have any additional questions.    Bradley Brown MD  Otorhinolaryngology-Head & Neck Surgery  [facility]

## 2019-12-08 NOTE — HPI
MS. Bae is a 58 yo F with right ear pain and drainage, s/p multiple outpatient abx courses for recurrent otitis media and ambulatory notes indicating that patient has been reporting purulent drainage that has not resolved despite recent outpatient anditbiotic courses.  Pt states that her symptoms began seven weeks ago as a right ear ache. She reports that she has had prior ear infections as an adult, previously requiring tympanostomy tubes placed in her 40s. She notes that since her symptoms began she has had pain in her right ear that now has spread to her behind her right ear with radiations down her right neck, as well as pain above and beside her right eye. She also notes that she has had significant exudate from her eyes starting 3 weeks ago that has improved since she started antibiotic drops 12/4. She reports chills and night sweats, as well as being awoken by pain around her eye that improved with repositioning in bed. She has also noted a significant cough for the past several weeks; this cough seems to come in fits and sometimes is so intense as to cause her to gag and vomit. Pt notes she has had pertussis a few years ago and was worried that this felt similar. Pt was referred to ENT after her 12/01 appt but patient has not been able to be seen yet.

## 2019-12-08 NOTE — NURSING
Spoke with on call kaushik about no orders at this time.  On call said he would see about the MD that is suppose to come see her and let me know.

## 2019-12-08 NOTE — SUBJECTIVE & OBJECTIVE
Past Medical History:   Diagnosis Date    Autoimmune disease     Autoimmune hepatitis     Bipolar 1 disorder     Depression     DVT (deep vein thrombosis) in pregnancy     GERD (gastroesophageal reflux disease)     History of GI bleed     Insomnia     Neuromuscular disorder     PE (pulmonary thromboembolism)     Scleromyxedema     Thyroid disease        Past Surgical History:   Procedure Laterality Date    ABDOMINAL SURGERY      APPENDECTOMY      BACK SURGERY      CARPAL TUNNEL RELEASE Bilateral     CERVICAL FUSION       SECTION      CHOLECYSTECTOMY      GASTRIC BYPASS      HYSTERECTOMY      LUMBAR FUSION      NECK SURGERY      spleenectomy      THYROID CYST EXCISION  1982    TONSILLECTOMY         Review of patient's allergies indicates:   Allergen Reactions    Ciprofloxacin Hives    Nsaids (non-steroidal anti-inflammatory drug) Other (See Comments)     Sensitive stomach, has had GI bleed.  AVM's.  Only short term use okay.    Phenobarbital Hives    Sulfa (sulfonamide antibiotics) Hives    Tramadol        Current Facility-Administered Medications on File Prior to Encounter   Medication    [COMPLETED] 0.9%  NaCl infusion    [COMPLETED] 0.9%  NaCl infusion    [COMPLETED] 0.9%  NaCl infusion    [COMPLETED] azithromycin 500 mg in dextrose 5 % 250 mL IVPB (ready to mix system)    [COMPLETED] benzonatate capsule 100 mg    [COMPLETED] morphine injection 6 mg    [COMPLETED] morphine injection 6 mg    [COMPLETED] ondansetron injection 4 mg    [COMPLETED] piperacillin-tazobactam 4.5 g in dextrose 5 % 100 mL IVPB (ready to mix system)    [COMPLETED] vancomycin 2 g in dextrose 5 % 500 mL IVPB     Current Outpatient Medications on File Prior to Encounter   Medication Sig    alendronate (FOSAMAX) 70 MG tablet Take 1 tablet (70 mg total) by mouth every 7 days.    amitriptyline (ELAVIL) 50 MG tablet Take 50 mg by mouth nightly.    clindamycin (CLEOCIN) 300 MG capsule Take 1  capsule (300 mg total) by mouth every 8 (eight) hours.    ferrous sulfate 325 mg (65 mg iron) Tab tablet Take 1 tablet (325 mg total) by mouth daily with breakfast.    gabapentin (NEURONTIN) 600 MG tablet Take 1 tablet (600 mg total) by mouth 3 (three) times daily.    levothyroxine (SYNTHROID) 75 MCG tablet Take 1 tablet (75 mcg total) by mouth once daily.    lithium 600 MG capsule Take 600 mg by mouth 2 (two) times daily.    meclizine (ANTIVERT) 25 mg tablet Take 1 tablet (25 mg total) by mouth 3 (three) times daily as needed for Dizziness.    methocarbamol (ROBAXIN) 750 MG Tab Take 1 tablet (750 mg total) by mouth 3 (three) times daily.    ofloxacin (OCUFLOX) 0.3 % ophthalmic solution Place 1 drop into both eyes 4 (four) times daily.    pantoprazole (PROTONIX) 40 MG tablet     trazodone (DESYREL) 100 MG tablet Take 200 mg by mouth every evening.     XIIDRA 5 % Dpet PLACE 1 GTT OU BID FOR DRY EYES    [DISCONTINUED] esomeprazole (NEXIUM) 40 MG capsule Take 40 mg by mouth.     Family History     Problem Relation (Age of Onset)    Breast cancer Mother, Sister, Maternal Grandmother, Paternal Grandmother    Colon cancer Mother (70), Father (55)        Tobacco Use    Smoking status: Former Smoker     Packs/day: 0.50     Years: 40.00     Pack years: 20.00     Types: Cigarettes     Last attempt to quit: 3/19/2019     Years since quittin.7    Smokeless tobacco: Never Used   Substance and Sexual Activity    Alcohol use: Yes     Comment: Socially    Drug use: No    Sexual activity: Not on file     Review of Systems   Constitutional: Positive for chills and diaphoresis.   HENT: Positive for ear discharge, ear pain and sinus pain.    Eyes: Positive for pain, discharge and redness.   Respiratory: Positive for shortness of breath.    Cardiovascular: Positive for chest pain (lower ribs, worse with coughing). Negative for palpitations and leg swelling.   Gastrointestinal: Positive for vomiting. Negative for  abdominal distention and abdominal pain.   Genitourinary: Positive for difficulty urinating (history of neurogenic bladder). Negative for dysuria.   Musculoskeletal: Positive for back pain and neck pain.   Skin: Negative for rash and wound.   Neurological: Positive for headaches.     Objective:     Vital Signs (Most Recent):  Temp: 99.1 °F (37.3 °C) (12/07/19 2224)  Pulse: 76 (12/07/19 2224)  Resp: 18 (12/07/19 2224)  BP: (!) 157/74 (12/07/19 2224)  SpO2: (!) 93 % (12/07/19 2224) Vital Signs (24h Range):  Temp:  [99.1 °F (37.3 °C)] 99.1 °F (37.3 °C)  Pulse:  [66-87] 76  Resp:  [15-20] 18  SpO2:  [93 %-98 %] 93 %  BP: (140-178)/(67-91) 157/74     Weight: 73 kg (160 lb 15 oz)  Body mass index is 25.21 kg/m².    Physical Exam   Constitutional: She is oriented to person, place, and time. She appears well-developed and well-nourished. She is active and cooperative. She does not appear ill. No distress.   HENT:   Right Ear: No drainage, swelling or tenderness. There is mastoid tenderness. Tympanic membrane is scarred. No middle ear effusion.   Left Ear: No drainage, swelling or tenderness. No mastoid tenderness. Tympanic membrane is scarred.  No middle ear effusion.   Eyes: Pupils are equal, round, and reactive to light. Conjunctivae and EOM are normal. Right eye exhibits no discharge. Left eye exhibits no discharge.   No pain on extra-occular muscle use   Neck: Normal range of motion. Neck supple. No JVD present. Muscular tenderness present. No neck rigidity. No tracheal deviation, no edema and no erythema present. No thyromegaly present.   No fluctuance or increased warmth noted along painful area of right neck   Cardiovascular: Normal rate, regular rhythm and normal heart sounds.   Pulmonary/Chest: Effort normal and breath sounds normal. No respiratory distress.   Abdominal: Soft. Bowel sounds are normal. She exhibits no distension. There is no tenderness.   Musculoskeletal: Normal range of motion. She exhibits no  edema, tenderness or deformity.   Lymphadenopathy:     She has no cervical adenopathy.   Neurological: She is alert and oriented to person, place, and time.   Skin: Skin is warm and dry. No rash noted. She is not diaphoretic. No erythema.   Psychiatric: She has a normal mood and affect. Her behavior is normal. Thought content normal. Her speech is not rapid and/or pressured and not tangential.   Circumstantial speech         CRANIAL NERVES     CN III, IV, VI   Pupils are equal, round, and reactive to light.  Extraocular motions are normal.        Significant Labs:   Blood Culture: No results for input(s): LABBLOO in the last 48 hours.  CMP:   Recent Labs   Lab 12/07/19  1425      K 4.4      CO2 24   GLU 90   BUN 8   CREATININE 0.7   CALCIUM 9.5   PROT 8.2   ALBUMIN 4.0   BILITOT 0.4   ALKPHOS 62   AST 24   ALT 15   ANIONGAP 11   EGFRNONAA >60.0     Lactic Acid:   Recent Labs   Lab 12/08/19  0109   LACTATE 1.0     Urine Studies:   Recent Labs   Lab 12/07/19  1736   COLORU Yellow   APPEARANCEUA Clear   PHUR 6.0   SPECGRAV 1.005   PROTEINUA Negative   GLUCUA Negative   KETONESU Negative   BILIRUBINUA Negative   OCCULTUA Negative   NITRITE Negative   LEUKOCYTESUR Negative     All pertinent labs within the past 24 hours have been reviewed.    Significant Imaging: I have reviewed all pertinent imaging results/findings within the past 24 hours.

## 2019-12-08 NOTE — PROGRESS NOTES
Pharmacokinetic Initial Assessment: IV Vancomycin    Assessment/Plan:    Continue intravenous vancomycin, after loading dose of 2000 mg given before consult, with 1000mg every 12 hours  Desired empiric serum trough concentration is 10 to 20 mcg/mL  Draw vancomycin trough level 30 min prior to fourth dose on 12/09 at approximately 0530  Pharmacy will continue to follow and monitor vancomycin.      Please contact pharmacy at extension [phone] with any questions regarding this assessment.     Thank you for the consult,   Hugo Ritchie       Patient brief summary:  Shreya Bae is a 59 y.o. female initiated on antimicrobial therapy with IV Vancomycin for treatment of suspected skin & soft tissue infection    Drug Allergies:   Review of patient's allergies indicates:   Allergen Reactions    Ciprofloxacin Hives    Nsaids (non-steroidal anti-inflammatory drug) Other (See Comments)     Sensitive stomach, has had GI bleed.  AVM's.  Only short term use okay.    Phenobarbital Hives    Sulfa (sulfonamide antibiotics) Hives    Tramadol        Actual Body Weight:   73kg    Renal Function:   Estimated Creatinine Clearance: 82.1 mL/min (based on SCr of 0.7 mg/dL).,     Dialysis Method (if applicable):  N/A    CBC (last 72 hours):  Recent Labs   Lab Result Units 12/07/19  1425   WBC K/uL 14.97*   Hemoglobin g/dL 12.7   Hematocrit % 38.5   Platelets K/uL 477*   Gran% % 70.0   Lymph% % 20.0   Mono% % 9.0   Eosinophil% % 0.0   Basophil% % 1.0   Differential Method  Manual       Metabolic Panel (last 72 hours):  Recent Labs   Lab Result Units 12/07/19  1425 12/07/19  1736 12/08/19  0123   Sodium mmol/L 141  --   --    Potassium mmol/L 4.4  --   --    Chloride mmol/L 106  --   --    CO2 mmol/L 24  --   --    Glucose mg/dL 90  --   --    Glucose, UA   --  Negative Negative   BUN, Bld mg/dL 8  --   --    Creatinine mg/dL 0.7  --   --    Albumin g/dL 4.0  --   --    Total Bilirubin mg/dL 0.4  --   --    Alkaline Phosphatase U/L 62   --   --    AST U/L 24  --   --    ALT U/L 15  --   --    Magnesium mg/dL 2.0  --   --        Drug levels (last 3 results):  No results for input(s): VANCOMYCINRA, VANCOMYCINPE, VANCOMYCINTR in the last 72 hours.    Microbiologic Results:  Microbiology Results (last 7 days)     Procedure Component Value Units Date/Time    Respiratory Infection Panel, Nasopharyngeal [[id number]]     Order Status:  No result Specimen:  Nasopharyngeal Swab     Culture, Respiratory with Gram Stain [[id number]]     Order Status:  No result Specimen:  Respiratory

## 2019-12-08 NOTE — NURSING
Received patient via EMS stretcher to room 3077. Patient aox4 in NAD. Oriented to room and bed, call light within reach. Call out to MD for orders.

## 2019-12-08 NOTE — ASSESSMENT & PLAN NOTE
59 yoF with 8 weeks of recurrent OM diagnosed by PCP and UC. Treated with multiple courses of abx with no resolution. Patient without otorrhea at present. TMs are clear, with small effusion on the right. Imaging with mastoid effusion, but no coalescence or cortical erosion. No suspicion for acute mastoiditis at this time. CT Neck normal. Leukocytosis downtrending.    -- No role for acute surgical intervention at this time  -- Would continue with medical management, IV abx as per primary team  -- Suspect some of her pain could be musculoskeletal  -- ENT will follow  -- Patient should follow up staff ENT, Dr. Ortega, as an outpatient for audiogram and ear evaluation  -- Seen and evaluated with staff, Dr. Ortega

## 2019-12-08 NOTE — ASSESSMENT & PLAN NOTE
-Clinic notes indicate patient followed for recurrent acute suppurative otitis media with spontaneous TM rupture.  On 12/01 Rx for cefdinir and clindamycin.    -Also notes in November indicate patient has been treated also with amoxicillin, doxycycline then a course of augmentin, report of patient stating purulent drainage from left ear.

## 2019-12-08 NOTE — SUBJECTIVE & OBJECTIVE
Medications:  Continuous Infusions:  Scheduled Meds:   amitriptyline  50 mg Oral Nightly    ceFEPime (MAXIPIME) IVPB  1 g Intravenous Q12H    ferrous sulfate  325 mg Oral Daily    gabapentin  600 mg Oral TID    levothyroxine  75 mcg Oral Daily    lithium  1,200 mg Oral QHS    ofloxacin  1 drop Both Eyes QID    traZODone  200 mg Oral QHS    vancomycin (VANCOCIN) IVPB  1,000 mg Intravenous Q12H     PRN Meds:benzonatate, dextromethorphan-guaifenesin  mg/5 ml, dextrose 10 % in water (D10W), dextrose 10 % in water (D10W), glucagon (human recombinant), glucose, glucose, methocarbamol, sodium chloride 0.9%     Current Facility-Administered Medications on File Prior to Encounter   Medication    [COMPLETED] 0.9%  NaCl infusion    [COMPLETED] 0.9%  NaCl infusion    [COMPLETED] 0.9%  NaCl infusion    [COMPLETED] azithromycin 500 mg in dextrose 5 % 250 mL IVPB (ready to mix system)    [COMPLETED] benzonatate capsule 100 mg    [COMPLETED] morphine injection 6 mg    [COMPLETED] morphine injection 6 mg    [COMPLETED] ondansetron injection 4 mg    [COMPLETED] piperacillin-tazobactam 4.5 g in dextrose 5 % 100 mL IVPB (ready to mix system)    [COMPLETED] vancomycin 2 g in dextrose 5 % 500 mL IVPB     Current Outpatient Medications on File Prior to Encounter   Medication Sig    alendronate (FOSAMAX) 70 MG tablet Take 1 tablet (70 mg total) by mouth every 7 days.    amitriptyline (ELAVIL) 50 MG tablet Take 50 mg by mouth nightly.    clindamycin (CLEOCIN) 300 MG capsule Take 1 capsule (300 mg total) by mouth every 8 (eight) hours.    ferrous sulfate 325 mg (65 mg iron) Tab tablet Take 1 tablet (325 mg total) by mouth daily with breakfast.    gabapentin (NEURONTIN) 600 MG tablet Take 1 tablet (600 mg total) by mouth 3 (three) times daily.    levothyroxine (SYNTHROID) 75 MCG tablet Take 1 tablet (75 mcg total) by mouth once daily.    lithium 600 MG capsule Take 600 mg by mouth 2 (two) times daily.     meclizine (ANTIVERT) 25 mg tablet Take 1 tablet (25 mg total) by mouth 3 (three) times daily as needed for Dizziness.    methocarbamol (ROBAXIN) 750 MG Tab Take 1 tablet (750 mg total) by mouth 3 (three) times daily.    ofloxacin (OCUFLOX) 0.3 % ophthalmic solution Place 1 drop into both eyes 4 (four) times daily.    pantoprazole (PROTONIX) 40 MG tablet     trazodone (DESYREL) 100 MG tablet Take 200 mg by mouth every evening.     XIIDRA 5 % Dpet PLACE 1 GTT OU BID FOR DRY EYES    [DISCONTINUED] esomeprazole (NEXIUM) 40 MG capsule Take 40 mg by mouth.       Review of patient's allergies indicates:   Allergen Reactions    Ciprofloxacin Hives    Nsaids (non-steroidal anti-inflammatory drug) Other (See Comments)     Sensitive stomach, has had GI bleed.  AVM's.  Only short term use okay.    Phenobarbital Hives    Sulfa (sulfonamide antibiotics) Hives    Tramadol        Past Medical History:   Diagnosis Date    Autoimmune disease     Autoimmune hepatitis     Bipolar 1 disorder     Depression     DVT (deep vein thrombosis) in pregnancy     GERD (gastroesophageal reflux disease)     History of GI bleed     Insomnia     Neuromuscular disorder     PE (pulmonary thromboembolism) 2010    Scleromyxedema     Thyroid disease      Past Surgical History:   Procedure Laterality Date    ABDOMINAL SURGERY      APPENDECTOMY      BACK SURGERY      CARPAL TUNNEL RELEASE Bilateral     CERVICAL FUSION       SECTION      CHOLECYSTECTOMY      GASTRIC BYPASS      HYSTERECTOMY      LUMBAR FUSION      NECK SURGERY      spleenectomy      THYROID CYST EXCISION      TONSILLECTOMY       Family History     Problem Relation (Age of Onset)    Breast cancer Mother, Sister, Maternal Grandmother, Paternal Grandmother    Colon cancer Mother (70), Father (55)        Tobacco Use    Smoking status: Former Smoker     Packs/day: 0.50     Years: 40.00     Pack years: 20.00     Types: Cigarettes     Last attempt  to quit: 3/19/2019     Years since quittin.7    Smokeless tobacco: Never Used   Substance and Sexual Activity    Alcohol use: Yes     Comment: Socially    Drug use: No    Sexual activity: Not on file     Review of Systems   Constitutional: Positive for fever. Negative for appetite change and chills.   HENT: Positive for congestion, ear discharge, ear pain, hearing loss and sore throat. Negative for facial swelling, trouble swallowing and voice change.    Eyes: Positive for discharge. Negative for photophobia and visual disturbance.   Respiratory: Positive for cough. Negative for shortness of breath, wheezing and stridor.    Cardiovascular: Negative for chest pain and palpitations.   Gastrointestinal: Negative for abdominal pain, nausea and vomiting.   Musculoskeletal: Negative for neck stiffness.   Neurological: Positive for dizziness. Negative for weakness.     Objective:     Vital Signs (Most Recent):  Temp: 97.8 °F (36.6 °C) (19)  Pulse: 79 (19)  Resp: 18 (19)  BP: (!) 123/57 (19)  SpO2: (!) 94 % (19) Vital Signs (24h Range):  Temp:  [97.8 °F (36.6 °C)-99.1 °F (37.3 °C)] 97.8 °F (36.6 °C)  Pulse:  [62-87] 79  Resp:  [15-20] 18  SpO2:  [93 %-98 %] 94 %  BP: (102-178)/(55-91) 123/57     Weight: 73 kg (160 lb 15 oz)  Body mass index is 25.21 kg/m².    Physical Exam  Appearance: Awake, alert and oriented. No acute distress.  Eyes: Pupils equal, round and reactive. Extraocular muscles intact. Vision grossly intact. No exudate present bilaterally  Nose: External appearance normal. No evidence of septal deviation. Inferior turbinates within normal limits  Ears:  Right: External appearance normal. External auditory canal within normal limits. Tympanic membrane translucent, some scarring present. Small serous effusion present. There is post-auricular pain on the right tracking down to the base of neck. No fluctuance or induration. Mastoid tip not particularly  tender  Left: External appearance normal. External auditory canal within normal limits. Tympanic membrane translucent. No evidence of infection or effusion. No tenderness over the mastoid or left neck  Mouth: Mucosal membranes moist. Tongue mobile. Oropharynx clear and patent. No erythema or exudate present in posterior OP  Neck: No anterior or posterior cervical lymphadenopathy. Ten  Respiratory: Normal work of breathing. No stridor or stertor    Significant Labs:  CBC:   Recent Labs   Lab 12/08/19  0518   WBC 8.53   RBC 3.75*   HGB 10.3*   HCT 33.9*   *   MCV 90   MCH 27.5   MCHC 30.4*     CMP:   Recent Labs   Lab 12/08/19  0518   *   CALCIUM 8.2*   ALBUMIN 3.0*   PROT 6.2      K 3.9   CO2 25      BUN 5*   CREATININE 0.7   ALKPHOS 46*   ALT 13   AST 20   BILITOT 0.2       Significant Diagnostics:  CT: I have reviewed all pertinent results/findings within the past 24 hours and my personal findings are:  Bilateral mastoid effusions, R>L. No evidence of coalescence or cortical erosion. Scutum sharp. CT Neck without any abnormal findings.

## 2019-12-08 NOTE — ASSESSMENT & PLAN NOTE
Pt with leukocytosis and Lactic acid of 1.51 on presentation to [facility] ED. Pt vitally stable and in no apparent distress on arrival to [facility].   -Presumed secondary to otitis media   -Patient had Blood cultures drawn and started on vanc/Zosyn azithromycin; s/p 30cc/kg bolus. Will continue broad antibiotics pending culture data.   -Repeat lactic acid 1.0, no longer trending.   -CXR 12/7: Stable chest, no acute disease identified   - Transferred for concern for mastoiditis; will obtain CT Temporal Bone with thin cuts non-contrast.   Case discussed with Dr. Ortega prior to transfer; he does not note cortical erosion that would give concern for mastoiditis on CT head at OSH  -Additional source coud be a neck infection, though no pocket of fluctuance or retropharyngeal bulging seen on exam. Will obtain CT Neck with contrast to r/o parapharyngeal space infection  -Repsiratory Infeciton Pane, UA reflex culture, Sputum culture, blood cultures pending  -ENT consulted, appreciate recommendations

## 2019-12-08 NOTE — ASSESSMENT & PLAN NOTE
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

## 2019-12-09 VITALS
HEART RATE: 65 BPM | TEMPERATURE: 99 F | HEIGHT: 67 IN | RESPIRATION RATE: 18 BRPM | WEIGHT: 170.75 LBS | OXYGEN SATURATION: 93 % | DIASTOLIC BLOOD PRESSURE: 71 MMHG | SYSTOLIC BLOOD PRESSURE: 149 MMHG | BODY MASS INDEX: 26.8 KG/M2

## 2019-12-09 LAB
ALBUMIN SERPL BCP-MCNC: 3.1 G/DL (ref 3.5–5.2)
ALP SERPL-CCNC: 45 U/L (ref 55–135)
ALT SERPL W/O P-5'-P-CCNC: 11 U/L (ref 10–44)
ANION GAP SERPL CALC-SCNC: 4 MMOL/L (ref 8–16)
AST SERPL-CCNC: 17 U/L (ref 10–40)
BASOPHILS # BLD AUTO: 0.06 K/UL (ref 0–0.2)
BASOPHILS NFR BLD: 0.7 % (ref 0–1.9)
BILIRUB SERPL-MCNC: 0.2 MG/DL (ref 0.1–1)
BUN SERPL-MCNC: 5 MG/DL (ref 8–23)
CALCIUM SERPL-MCNC: 8.9 MG/DL (ref 8.7–10.5)
CHLORIDE SERPL-SCNC: 106 MMOL/L (ref 95–110)
CO2 SERPL-SCNC: 28 MMOL/L (ref 23–29)
CREAT SERPL-MCNC: 0.8 MG/DL (ref 0.5–1.4)
DIFFERENTIAL METHOD: ABNORMAL
EOSINOPHIL # BLD AUTO: 0.4 K/UL (ref 0–0.5)
EOSINOPHIL NFR BLD: 4.2 % (ref 0–8)
ERYTHROCYTE [DISTWIDTH] IN BLOOD BY AUTOMATED COUNT: 18.9 % (ref 11.5–14.5)
EST. GFR  (AFRICAN AMERICAN): >60 ML/MIN/1.73 M^2
EST. GFR  (NON AFRICAN AMERICAN): >60 ML/MIN/1.73 M^2
GLUCOSE SERPL-MCNC: 115 MG/DL (ref 70–110)
HCT VFR BLD AUTO: 36 % (ref 37–48.5)
HGB BLD-MCNC: 10.7 G/DL (ref 12–16)
IMM GRANULOCYTES # BLD AUTO: 0.03 K/UL (ref 0–0.04)
IMM GRANULOCYTES NFR BLD AUTO: 0.4 % (ref 0–0.5)
LYMPHOCYTES # BLD AUTO: 3.1 K/UL (ref 1–4.8)
LYMPHOCYTES NFR BLD: 37.1 % (ref 18–48)
MAGNESIUM SERPL-MCNC: 2 MG/DL (ref 1.6–2.6)
MCH RBC QN AUTO: 27.2 PG (ref 27–31)
MCHC RBC AUTO-ENTMCNC: 29.7 G/DL (ref 32–36)
MCV RBC AUTO: 92 FL (ref 82–98)
MONOCYTES # BLD AUTO: 1.3 K/UL (ref 0.3–1)
MONOCYTES NFR BLD: 16.3 % (ref 4–15)
NEUTROPHILS # BLD AUTO: 3.4 K/UL (ref 1.8–7.7)
NEUTROPHILS NFR BLD: 41.3 % (ref 38–73)
NRBC BLD-RTO: 0 /100 WBC
PHOSPHATE SERPL-MCNC: 3.9 MG/DL (ref 2.7–4.5)
PLATELET # BLD AUTO: 460 K/UL (ref 150–350)
PMV BLD AUTO: 10.1 FL (ref 9.2–12.9)
POTASSIUM SERPL-SCNC: 4.5 MMOL/L (ref 3.5–5.1)
PROT SERPL-MCNC: 6.7 G/DL (ref 6–8.4)
RBC # BLD AUTO: 3.93 M/UL (ref 4–5.4)
SODIUM SERPL-SCNC: 138 MMOL/L (ref 136–145)
VANCOMYCIN TROUGH SERPL-MCNC: 11.3 UG/ML (ref 10–22)
WBC # BLD AUTO: 8.24 K/UL (ref 3.9–12.7)

## 2019-12-09 PROCEDURE — 25000003 PHARM REV CODE 250: Performed by: STUDENT IN AN ORGANIZED HEALTH CARE EDUCATION/TRAINING PROGRAM

## 2019-12-09 PROCEDURE — 36415 COLL VENOUS BLD VENIPUNCTURE: CPT

## 2019-12-09 PROCEDURE — G0378 HOSPITAL OBSERVATION PER HR: HCPCS

## 2019-12-09 PROCEDURE — 63600175 PHARM REV CODE 636 W HCPCS: Performed by: HOSPITALIST

## 2019-12-09 PROCEDURE — 80202 ASSAY OF VANCOMYCIN: CPT

## 2019-12-09 PROCEDURE — 63600175 PHARM REV CODE 636 W HCPCS: Performed by: STUDENT IN AN ORGANIZED HEALTH CARE EDUCATION/TRAINING PROGRAM

## 2019-12-09 PROCEDURE — 99225 PR SUBSEQUENT OBSERVATION CARE,LEVEL II: ICD-10-PCS | Mod: ,,, | Performed by: HOSPITALIST

## 2019-12-09 PROCEDURE — 83735 ASSAY OF MAGNESIUM: CPT

## 2019-12-09 PROCEDURE — 80053 COMPREHEN METABOLIC PANEL: CPT

## 2019-12-09 PROCEDURE — 85025 COMPLETE CBC W/AUTO DIFF WBC: CPT

## 2019-12-09 PROCEDURE — 99225 PR SUBSEQUENT OBSERVATION CARE,LEVEL II: CPT | Mod: ,,, | Performed by: HOSPITALIST

## 2019-12-09 PROCEDURE — 96376 TX/PRO/DX INJ SAME DRUG ADON: CPT

## 2019-12-09 PROCEDURE — 84100 ASSAY OF PHOSPHORUS: CPT

## 2019-12-09 RX ORDER — CIPROFLOXACIN AND DEXAMETHASONE 3; 1 MG/ML; MG/ML
4 SUSPENSION/ DROPS AURICULAR (OTIC) 2 TIMES DAILY
Qty: 7.5 ML | Refills: 0 | Status: ON HOLD | OUTPATIENT
Start: 2019-12-09 | End: 2019-12-19

## 2019-12-09 RX ORDER — OFLOXACIN 3 MG/ML
5 SOLUTION AURICULAR (OTIC) 2 TIMES DAILY
Qty: 10 ML | Refills: 1 | Status: SHIPPED | OUTPATIENT
Start: 2019-12-09 | End: 2019-12-09 | Stop reason: HOSPADM

## 2019-12-09 RX ORDER — OFLOXACIN 3 MG/ML
1 SOLUTION/ DROPS OPHTHALMIC 4 TIMES DAILY
Qty: 10 ML | Refills: 0 | Status: SHIPPED | OUTPATIENT
Start: 2019-12-09 | End: 2019-12-19

## 2019-12-09 RX ORDER — CEFPODOXIME PROXETIL 200 MG/1
200 TABLET, FILM COATED ORAL EVERY 12 HOURS
Qty: 20 TABLET | Refills: 0 | Status: SHIPPED | OUTPATIENT
Start: 2019-12-09 | End: 2019-12-19

## 2019-12-09 RX ORDER — CEFEPIME HYDROCHLORIDE 1 G/1
1 INJECTION, POWDER, FOR SOLUTION INTRAMUSCULAR; INTRAVENOUS
Status: DISCONTINUED | OUTPATIENT
Start: 2019-12-09 | End: 2019-12-09 | Stop reason: HOSPADM

## 2019-12-09 RX ORDER — AMOXICILLIN AND CLAVULANATE POTASSIUM 875; 125 MG/1; MG/1
1 TABLET, FILM COATED ORAL EVERY 12 HOURS
Qty: 9 TABLET | Refills: 0 | Status: ON HOLD | OUTPATIENT
Start: 2019-12-09 | End: 2019-12-19

## 2019-12-09 RX ORDER — METHOCARBAMOL 750 MG/1
750 TABLET, FILM COATED ORAL 3 TIMES DAILY PRN
Qty: 90 TABLET | Refills: 1 | Status: SHIPPED | OUTPATIENT
Start: 2019-12-09 | End: 2020-12-21

## 2019-12-09 RX ORDER — ACETAMINOPHEN 500 MG
500 TABLET ORAL EVERY 6 HOURS PRN
Qty: 60 TABLET | Refills: 0 | Status: ON HOLD | OUTPATIENT
Start: 2019-12-09 | End: 2022-03-21 | Stop reason: HOSPADM

## 2019-12-09 RX ORDER — BENZONATATE 200 MG/1
200 CAPSULE ORAL 3 TIMES DAILY PRN
Qty: 30 CAPSULE | Refills: 1 | Status: ON HOLD | OUTPATIENT
Start: 2019-12-09 | End: 2019-12-19

## 2019-12-09 RX ORDER — OXYCODONE HYDROCHLORIDE 5 MG/1
5 TABLET ORAL EVERY 6 HOURS PRN
Qty: 28 TABLET | Refills: 0 | Status: SHIPPED | OUTPATIENT
Start: 2019-12-09 | End: 2019-12-16

## 2019-12-09 RX ADMIN — CEFEPIME 1 G: 1 INJECTION, POWDER, FOR SOLUTION INTRAMUSCULAR; INTRAVENOUS at 03:12

## 2019-12-09 RX ADMIN — ACETAMINOPHEN 500 MG: 500 TABLET ORAL at 06:12

## 2019-12-09 RX ADMIN — ONDANSETRON 4 MG: 2 INJECTION INTRAMUSCULAR; INTRAVENOUS at 02:12

## 2019-12-09 RX ADMIN — OXYCODONE HYDROCHLORIDE 5 MG: 5 TABLET ORAL at 06:12

## 2019-12-09 RX ADMIN — OFLOXACIN 1 DROP: 3 SOLUTION/ DROPS OPHTHALMIC at 08:12

## 2019-12-09 RX ADMIN — OFLOXACIN 1 DROP: 3 SOLUTION/ DROPS OPHTHALMIC at 04:12

## 2019-12-09 RX ADMIN — GABAPENTIN 600 MG: 300 CAPSULE ORAL at 04:12

## 2019-12-09 RX ADMIN — OFLOXACIN 5 DROP: 3 SOLUTION AURICULAR (OTIC) at 08:12

## 2019-12-09 RX ADMIN — GABAPENTIN 600 MG: 300 CAPSULE ORAL at 08:12

## 2019-12-09 RX ADMIN — VANCOMYCIN HYDROCHLORIDE 1000 MG: 1 INJECTION, POWDER, LYOPHILIZED, FOR SOLUTION INTRAVENOUS at 07:12

## 2019-12-09 RX ADMIN — METHOCARBAMOL TABLETS 750 MG: 750 TABLET, COATED ORAL at 06:12

## 2019-12-09 RX ADMIN — CEFEPIME 1 G: 1 INJECTION, POWDER, FOR SOLUTION INTRAMUSCULAR; INTRAVENOUS at 04:12

## 2019-12-09 RX ADMIN — OFLOXACIN 1 DROP: 3 SOLUTION/ DROPS OPHTHALMIC at 01:12

## 2019-12-09 RX ADMIN — GUAIFENESIN AND DEXTROMETHORPHAN HYDROBROMIDE 1 TABLET: 600; 30 TABLET, EXTENDED RELEASE ORAL at 08:12

## 2019-12-09 RX ADMIN — FERROUS SULFATE TAB EC 325 MG (65 MG FE EQUIVALENT) 325 MG: 325 (65 FE) TABLET DELAYED RESPONSE at 08:12

## 2019-12-09 RX ADMIN — BENZONATATE 200 MG: 100 CAPSULE ORAL at 06:12

## 2019-12-09 RX ADMIN — ONDANSETRON 4 MG: 2 INJECTION INTRAMUSCULAR; INTRAVENOUS at 06:12

## 2019-12-09 RX ADMIN — LIDOCAINE 1 PATCH: 50 PATCH TOPICAL at 04:12

## 2019-12-09 RX ADMIN — LEVOTHYROXINE SODIUM 75 MCG: 25 TABLET ORAL at 06:12

## 2019-12-09 RX ADMIN — ACETAMINOPHEN 500 MG: 500 TABLET ORAL at 01:12

## 2019-12-09 RX ADMIN — OXYCODONE HYDROCHLORIDE 5 MG: 5 TABLET ORAL at 01:12

## 2019-12-09 NOTE — PLAN OF CARE
CM met with patient at the bedside to discuss discharge planning assessment. Pt lives with a friend and has transportation at discharge.  Pt verified PCP and Pharmacy. CM will continue to follow for discharge needs.        12/09/19 1017   Discharge Assessment   Assessment Type Discharge Planning Assessment   Confirmed/corrected address and phone number on facesheet? Yes   Assessment information obtained from? Patient   Expected Length of Stay (days) 3   Communicated expected length of stay with patient/caregiver yes   Prior to hospitilization cognitive status: Alert/Oriented   Prior to hospitalization functional status: Independent   Current cognitive status: Alert/Oriented   Current Functional Status: Independent   Facility Arrived From: [facility]    Lives With friend(s)   Able to Return to Prior Arrangements yes   Is patient able to care for self after discharge? Yes   Who are your caregiver(s) and their phone number(s)? Christal Ramireznings- [place]- [phone]   Patient's perception of discharge disposition home or selfcare   Readmission Within the Last 30 Days no previous admission in last 30 days   Patient currently being followed by outpatient case management? No   Patient currently receives any other outside agency services? No   Equipment Currently Used at Home none   Do you have any problems affording any of your prescribed medications? No   Is the patient taking medications as prescribed? yes   Does the patient have transportation home? Yes   Transportation Anticipated family or friend will provide   Does the patient receive services at the [facility]mad[facility] Clinic? No   Discharge Plan A Home with family   Discharge Plan B Home with family   DME Needed Upon Discharge  none   Patient/Family in Agreement with Plan yes

## 2019-12-09 NOTE — PHARMACY MED REC
"Admission Medication Reconciliation - Pharmacy Consult Note    The home medication history was taken by Tova Patel, Pharmacy Technician.  Based on information gathered and subsequent review by the clinical pharmacist, the items below may need attention.    You may go to "Admission" then "Reconcile Home Medications" tabs to review and/or act upon these items.      No clinically relevant issues noted with the medication reconciliation at this time.      Please address this information as you see fit.  Feel free to contact us if you have any questions or require assistance.      Thank you,   Tatiana Cabrera, PharmD  Emergency Medicine Clinical Pharmacist  [phone] (2pm-midnight daily)      .    .          "

## 2019-12-09 NOTE — PROGRESS NOTES
Pharmacokinetic Assessment Follow Up: IV Vancomycin    Vancomycin serum concentration assessment(s):    The trough level was drawn correctly and can be used to guide therapy at this time. The measurement is below the desired definitive target range of 15 to 20 mcg/mL.    Vancomycin Regimen Plan:    Change regimen to Vancomycin 1250 mg IV every 12 hours with next serum trough concentration measured at 0530 prior to 4th dose on 12/11     Drug levels (last 3 results):  Recent Labs   Lab Result Units 12/09/19  0507   Vancomycin-Trough ug/mL 11.3       Pharmacy will continue to follow and monitor vancomycin.    Please contact pharmacy at extension [phone] for questions regarding this assessment.    Thank you for the consult,   Ferny Perez       Patient brief summary:  Shreya Bae is a 59 y.o. female initiated on antimicrobial therapy with IV Vancomycin for treatment of sepsis      Drug Allergies:   Review of patient's allergies indicates:   Allergen Reactions    Ciprofloxacin Hives    Nsaids (non-steroidal anti-inflammatory drug) Other (See Comments)     Sensitive stomach, has had GI bleed.  AVM's.  Only short term use okay.    Phenobarbital Hives    Sulfa (sulfonamide antibiotics) Hives    Tramadol        Actual Body Weight:   77.4kg    Renal Function:   Estimated Creatinine Clearance: 79.3 mL/min (based on SCr of 0.8 mg/dL).,     Dialysis Method (if applicable):  N/A    CBC (last 72 hours):  Recent Labs   Lab Result Units 12/07/19  1425 12/08/19  0518 12/09/19  0413   WBC K/uL 14.97* 8.53 8.24   Hemoglobin g/dL 12.7 10.3* 10.7*   Hematocrit % 38.5 33.9* 36.0*   Platelets K/uL 477* 411* 460*   Gran% % 70.0 51.1 41.3   Lymph% % 20.0 29.7 37.1   Mono% % 9.0 14.7 16.3*   Eosinophil% % 0.0 3.3 4.2   Basophil% % 1.0 0.8 0.7   Differential Method  Manual Automated Automated       Metabolic Panel (last 72 hours):  Recent Labs   Lab Result Units 12/07/19  1425 12/07/19  1736 12/08/19  0123 12/08/19  0518 12/09/19  0413    Sodium mmol/L 141  --   --  141 138   Potassium mmol/L 4.4  --   --  3.9 4.5   Chloride mmol/L 106  --   --  110 106   CO2 mmol/L 24  --   --  25 28   Glucose mg/dL 90  --   --  124* 115*   Glucose, UA   --  Negative Negative  --   --    BUN, Bld mg/dL 8  --   --  5* 5*   Creatinine mg/dL 0.7  --   --  0.7 0.8   Albumin g/dL 4.0  --   --  3.0* 3.1*   Total Bilirubin mg/dL 0.4  --   --  0.2 0.2   Alkaline Phosphatase U/L 62  --   --  46* 45*   AST U/L 24  --   --  20 17   ALT U/L 15  --   --  13 11   Magnesium mg/dL 2.0  --   --  2.0 2.0   Phosphorus mg/dL  --   --   --  3.9 3.9       Vancomycin Administrations:  vancomycin given in the last 96 hours                     vancomycin in dextrose 5 % 1 gram/250 mL IVPB 1,000 mg (mg) 1,000 mg New Bag 12/09/19 0712     1,000 mg New Bag 12/08/19 1946     1,000 mg New Bag  0432    vancomycin 2 g in dextrose 5 % 500 mL IVPB (mg) 2,000 mg New Bag 12/07/19 1758                      Microbiologic Results:  Microbiology Results (last 7 days)       Procedure Component Value Units Date/Time    Culture, Respiratory with Gram Stain [[id number]] Collected:  12/08/19 1455    Order Status:  Completed Specimen:  Respiratory from Sputum Updated:  12/08/19 2053     Gram Stain (Respiratory) >10 epithelial cells per low power field     Gram Stain (Respiratory) Many WBC's     Gram Stain (Respiratory) Moderate Gram positive cocci    Respiratory Infection Panel, Nasopharyngeal [[id number]] Collected:  12/08/19 9652    Order Status:  Completed Specimen:  Nasopharyngeal Swab Updated:  12/08/19 0921     Respiratory Infection Panel Source NP Swab     Adenovirus Not Detected     Coronavirus 229E Not Detected     Coronavirus HKU1 Not Detected     Coronavirus NL63 Not Detected     Coronavirus OC43 Not Detected     Human Metapneumovirus Not Detected     Human Rhinovirus/Enterovirus Not Detected     Influenza A (subtypes H1, H1-2009,H3) Not Detected     Influenza B Not Detected     Parainfluenza Virus 1  Not Detected     Parainfluenza Virus 2 Not Detected     Parainfluenza Virus 3 Not Detected     Parainfluenza Virus 4 Not Detected     Respiratory Syncytial Virus Not Detected     Bordetella Parapertussis (WI5634) Not Detected     Bordetella pertussis (ptxP) Not Detected     Chlamydia pneumoniae Not Detected     Mycoplasma pneumoniae Not Detected    Narrative:       For all other respiratory sources order [id number] Respiratory  Viral Panel by PCR (RVPCR)

## 2019-12-09 NOTE — PLAN OF CARE
12/09/19 1023   Post-Acute Status   Post-Acute Authorization Other   Other Status No Post-Acute Service Needs     May need transport home.

## 2019-12-09 NOTE — NURSING
Oncoming shift notified of loss of PIV. Unsuccessful attempt to restart. Charge nurse to re-attempt.

## 2019-12-09 NOTE — DISCHARGE SUMMARY
[facility] Hospital Medicine  Discharge Summary      Patient Name: Shreya Bae  MRN: [id number]  Admission Date: 12/7/2019  Hospital Length of Stay: 0 days  Discharge Date and Time:  12/09/2019 2:36 PM  Attending Physician: Grace Jason MD   Discharging Provider: Joshua Weaver MD  Primary Care Provider: Dean Vidales MD    [facility] Medicine Team: [facility] 4 Joshua Weaver MD    HPI: Ms. Bae is a 58 yo F with right ear pain and drainage, s/p multiple outpatient abx courses for recurrent otitis media and ambulatory notes indicating that patient has been reporting purulent drainage that has not resolved despite recent outpatient anditbiotic courses.   Pt states that her symptoms began seven weeks ago as a right ear ache. She reports that she has had prior ear infections as an adult, previously requiring tympanostomy tubes placed in her 40s. She notes that since her symptoms began she has had pain in her right ear that now has spread to her behind her right ear with radiations down her right neck, as well as pain above and beside her right eye. She also notes that she has had significant exudate from her eyes starting 3 weeks ago that has improved since she started antibiotic drops 12/4. She reports chills and night sweats, as well as being awoken by pain around her eye that improved with repositioning in bed. She has also noted a significant cough for the past several weeks; this cough seems to come in fits and sometimes is so intense as to cause her to gag and vomit. Pt notes she has had pertussis a few years ago and was worried that this felt similar. Pt was referred to ENT after her 12/01 appt but patient has not been able to be seen yet.     * No surgery found *      Hospital Course: Patient was admitted to [facility]. The patient was not septic on arrival but was kept on broad spectrum antibiotics including Vanc, Cefepime, and azithromycin.  Dedicated CT of temporal bone and soft  tissues of neck were obtained which showed no mastoiditis or posterior pharyngeal abscess however there was a moderate right mastoid effusion.  ENT consulted and recommended at least 24 hours of IV antibiotics and extended course of oral antibiotics for likely severe case of otitis media. The patient did well but suffered from recurrent pain both in her ear and abdominal pain associated with cough.  She was treated with multimodal pain management including tylenol, muscle relaxants, gabapentin, and PRN oxycodone for her pain which seemed to be controlled with this regimen. Patient was concerned about something internal going on in her abdomen and US obtained only showed hepatomegaly in this patient with history of AIH. Patient was stable for discharge on hospital day two with ambulatory referral placed to ENT and 10 day course of amoxicillin-clavulanate. ENT recommends she follow up with Dr. Ortega for audiogram and further ear evaluation. ENT notified that patient with medicaid insurance and to have her appointment scheduled.    Vitals:    12/09/19 1542   BP: (!) 149/71   Pulse: 65   Resp: 18   Temp: 98.9 °F (37.2 °C)     Physical Exam   Constitutional: She is well-developed, well-nourished, and in no distress. No distress.   HENT:   Head: Normocephalic and atraumatic.   Eyes: Pupils are equal, round, and reactive to light. Right eye exhibits no discharge. Left eye exhibits no discharge. No scleral icterus.   Neck: Normal range of motion.   Cardiovascular: Normal rate, regular rhythm and normal heart sounds. Exam reveals no gallop and no friction rub.   No murmur heard.  Pulmonary/Chest: Effort normal. No respiratory distress. She has no wheezes. She has rales.   Abdominal: Soft. Bowel sounds are normal. She exhibits no distension. There is no tenderness. There is no rebound.   Musculoskeletal: Normal range of motion. She exhibits no edema.   Neurological: She is alert.   Skin: Skin is warm and dry. No rash noted.  She is not diaphoretic. No erythema.     Consults:   Consults (From admission, onward)        Status Ordering Provider     Inpatient consult to ENT  Once     Provider:  (Not yet assigned)    Completed ARNOLD SAXENA     Inpatient consult to PICC team ([facility])  Once     Provider:  (Not yet assigned)    Completed TANMAY WILKINSON     Pharmacy to dose Vancomycin consult  Once     Provider:  (Not yet assigned)    Acknowledged ARNOLD SAXENA          Final Active Diagnoses:    Diagnosis Date Noted POA    PRINCIPAL PROBLEM:  Recurrent suppurative otitis media of right ear [H66.41] 12/08/2019 Yes    Sepsis [A41.9] 12/07/2019 Yes    Acquired hypothyroidism [E03.9] 04/18/2018 Yes    Personal history of DVT (deep vein thrombosis) [Z86.718] 04/18/2015 Not Applicable    Bipolar 1 disorder, depressed [F31.9] 04/18/2015 Yes      Problems Resolved During this Admission:      Discharged Condition: good    Disposition: Home or Self Care    Follow Up:  Follow-up Information     Dean Vidales MD In 1 month.    Specialty:  Family Medicine  Contact information:  [address] [facility]  [place] [address]  [phone]             [facility] - Otorhinolaryngology. Schedule an appointment as soon as possible for a visit in 2 weeks.    Specialty:  Otolaryngology  Contact information:  [address]  [facility] [address] [phone]  Additional information:  [facility] [place] - 4th Floor           Schedule an appointment as soon as possible for a visit to follow up.               Patient Instructions:      Ambulatory Referral to ENT   Referral Priority: Urgent Referral Type: Consultation   Referral Reason: Specialty Services Required   Referred to Provider: ELSA AUSTIN Specialty: Otolaryngology   Number of Visits Requested: 1     Notify your health care provider if you experience any of the following:  temperature >100.4     Notify your health care provider if you experience any of the  following:  persistent nausea and vomiting or diarrhea     Notify your health care provider if you experience any of the following:  severe uncontrolled pain     Notify your health care provider if you experience any of the following:  redness, tenderness, or signs of infection (pain, swelling, redness, odor or green/yellow discharge around incision site)     Notify your health care provider if you experience any of the following:  difficulty breathing or increased cough     Notify your health care provider if you experience any of the following:  severe persistent headache     Notify your health care provider if you experience any of the following:  worsening rash     Notify your health care provider if you experience any of the following:  persistent dizziness, light-headedness, or visual disturbances     Notify your health care provider if you experience any of the following:  increased confusion or weakness     Notify your health care provider if you experience any of the following:     Medications:  Reconciled Home Medications:      Medication List      START taking these medications    acetaminophen 500 MG tablet  Commonly known as:  TYLENOL  Take 1 tablet (500 mg total) by mouth every 6 (six) hours as needed for Pain.     amoxicillin-clavulanate 875-125mg 875-125 mg per tablet  Commonly known as:  AUGMENTIN  Take 1 tablet by mouth every 12 (twelve) hours.     benzonatate 200 MG capsule  Commonly known as:  TESSALON  Take 1 capsule (200 mg total) by mouth 3 (three) times daily as needed for Cough.     Ciprodex 0.3-0.1 % Drps  Generic drug:  ciprofloxacin-dexamethasone 0.3-0.1%  Place 4 drops into the right ear 2 (two) times daily. for 10 days     dextromethorphan-guaifenesin  mg  mg per 12 hr tablet  Commonly known as:  MUCINEX DM  Take 1 tablet by mouth 2 (two) times daily. for 10 days     oxyCODONE 5 MG immediate release tablet  Commonly known as:  ROXICODONE  Take 1 tablet (5 mg total) by mouth  every 6 (six) hours as needed (moderate pain).        CHANGE how you take these medications    alendronate 70 MG tablet  Commonly known as:  FOSAMAX  Take 1 tablet (70 mg total) by mouth every 7 days.  What changed:  additional instructions        CONTINUE taking these medications    amitriptyline 50 MG tablet  Commonly known as:  ELAVIL  Take 50 mg by mouth nightly.     ferrous sulfate 325 mg (65 mg iron) Tab tablet  Commonly known as:  FEOSOL  Take 1 tablet (325 mg total) by mouth daily with breakfast.     gabapentin 600 MG tablet  Commonly known as:  NEURONTIN  Take 1 tablet (600 mg total) by mouth 3 (three) times daily.     levothyroxine 75 MCG tablet  Commonly known as:  SYNTHROID  Take 1 tablet (75 mcg total) by mouth once daily.     lithium 600 MG capsule  Take 1,200 mg by mouth every evening.     meclizine 25 mg tablet  Commonly known as:  ANTIVERT  Take 1 tablet (25 mg total) by mouth 3 (three) times daily as needed for Dizziness.     methocarbamol 750 MG Tab  Commonly known as:  ROBAXIN  Take 1 tablet (750 mg total) by mouth 3 (three) times daily as needed (muscle spasms).     ofloxacin 0.3 % ophthalmic solution  Commonly known as:  OCUFLOX  Place 1 drop into both eyes 4 (four) times daily. for 10 days     pantoprazole 40 MG tablet  Commonly known as:  PROTONIX  Take 40 mg by mouth once daily.     traZODone 100 MG tablet  Commonly known as:  DESYREL  Take 200 mg by mouth every evening.     Ventolin HFA 90 mcg/actuation inhaler  Generic drug:  albuterol  Inhale 2 puffs into the lungs every 4 to 6 hours as needed for Wheezing or Shortness of Breath. Rescue        STOP taking these medications    clindamycin 300 MG capsule  Commonly known as:  CLEOCIN     nitrofurantoin (macrocrystal-monohydrate) 100 MG capsule  Commonly known as:  MACROBID     Xiidra 5 % Dpet  Generic drug:  lifitegrast            Significant Diagnostic Studies: Labs:   CMP   Recent Labs   Lab 12/08/19  0518 12/09/19  0413    138   K  3.9 4.5    106   CO2 25 28   * 115*   BUN 5* 5*   CREATININE 0.7 0.8   CALCIUM 8.2* 8.9   PROT 6.2 6.7   ALBUMIN 3.0* 3.1*   BILITOT 0.2 0.2   ALKPHOS 46* 45*   AST 20 17   ALT 13 11   ANIONGAP 6* 4*   ESTGFRAFRICA >60.0 >60.0   EGFRNONAA >60.0 >60.0    and CBC   Recent Labs   Lab 12/08/19  0518 12/09/19  0413   WBC 8.53 8.24   HGB 10.3* 10.7*   HCT 33.9* 36.0*   * 460*     Microbiology:   Blood Culture   Lab Results   Component Value Date    LABBLOO No Growth to date 12/07/2019    LABBLOO No Growth to date 12/07/2019    and Sputum Culture   Lab Results   Component Value Date    GSRESP >10 epithelial cells per low power field 12/08/2019    GSRESP Many WBC's 12/08/2019    GSRESP Moderate Gram positive cocci 12/08/2019     EXAMINATION:  CT TEMPORAL BONE WITHOUT CONTRAST    CLINICAL HISTORY:  Concern for Mastoiditis;    TECHNIQUE:  Axial images were acquired through the temporal bones and skull base without contrast administration.  Coronal and sagittal reconstructions were performed.    COMPARISON:  None    FINDINGS:  Right:    External ear: Within normal limits.    Middle ear: Soft tissue attenuation material within the middle ear.  No evidence of ossicular erosion.    Petrous temporal bone/mastoid air cells: Large right mastoid effusion without evidence of coalescent mastoiditis.    Inner ear: Within normal limits.    IAC/CPA: Within normal limits.    Other: N/A.    Left:    External ear: Within normal limits.    Middle ear: Within normal limits.    Petrous temporal bone/mastoid air cells: Clear.    Inner ear: Within normal limits.    IAC/CPA: Within normal limits.    Other: N/A.    Intracranial Compartment (limited evaluation): Normal.    Skull/Extracranial Contents (limited evaluation): Mild mucoperiosteal thickening of the sphenoid sinuses and ethmoid air cells.      Impression       1. Soft tissue material likely representing inflammatory debris within the right middle ear cavity concerning  for otitis media.  No evidence of middle ear ossicular erosion.  2. Large right mastoid effusion.  No evidence of coalescent mastoiditis.     EXAMINATION:  CT SOFT TISSUE NECK W WO CONTRAST    CLINICAL HISTORY:  concern for parapharyngeal space infection;    TECHNIQUE:  Low dose axial images as well as sagittal and coronal reconstructions were performed from the skull base to the clavicles following the intravenous administration of 75 mL of Omnipaque 350.    COMPARISON:  CT 04/19/2018    FINDINGS:  The visualized structures of the base of the brain demonstrate no gross abnormality.  A large right-sided mastoid effusion is present.  Paranasal sinuses and left mastoid air cells are clear.    The airways and vascular structures are patent.  There is mild calcific atherosclerosis of the aorta and carotid bifurcations.  The soft tissues of the nasopharynx, oropharynx, hypopharynx, and larynx are unremarkable.  The thyroid gland demonstrates postsurgical changes focal lesion.  Multiple normal sized cervical lymph nodes are identified without abnormal lymph node enlargement.  The submandibular and parotid glands are unremarkable.    The lung apices demonstrate multiple subpleural blebs.  A few normal sized mediastinal nodes are identified.  The osseous structures demonstrate postsurgical changes of C4 through C6 ACDF with laminectomies from C3-C6.  There has been removal of prior posterior fusion hardware from C2-T1 since CT 04/19/2018.  Surgical hardware is present in the C2-3 facets bilaterally.      Impression       1. No evidence of pharyngeal space abscess as clinically questioned.  2. Large right mastoid effusion.  3. Postsurgical changes of the cervical spine and thyroid.  4. Additional findings as detailed above.     EXAMINATION:  US ABDOMEN COMPLETE    CLINICAL HISTORY:  abdominal pain;    TECHNIQUE:  Complete abdominal ultrasound (including pancreas, liver, gallbladder, common bile duct, spleen, aorta, IVC, and  kidneys) was performed.    COMPARISON:  Ultrasound retroperitoneal 08/13/2019, 01/22/2019.    FINDINGS:  Liver: Enlarged measuring 19.3 cm. Homogeneous echotexture.  No focal hepatic lesions.    Gallbladder: Surgically absent.    Biliary system: The common duct demonstrates mild nonspecific dilatation, measuring 8 mm.  No intrahepatic ductal dilatation.    Spleen: Surgically removed.    Pancreas: Not well evaluated secondary to bowel gas artifact.    Right kidney: Normal in size with no hydronephrosis, measuring 11.3 cm.    Left kidney:  Minimal hydronephrosis which may be related to the passing of previously identified left-sided calculus.  The left kidney measures approximately 11.7 cm.    Aorta: Not well evaluated secondary to bowel gas artifact.    Inferior vena cava: Normal in appearance.    Miscellaneous: No ascites.      Impression       Minimal left-sided hydronephrosis which may be related to the passing of previously identified left-sided calculus.  CT renal stone study would be helpful for further evaluation if clinically warranted.    Hepatomegaly.    Prior cholecystectomy and splenectomy mild dilatation of the common bile duct similar to exam from 02/13/2008.         Pending Diagnostic Studies:     None        Indwelling Lines/Drains at time of discharge:   Lines/Drains/Airways     None                 Time spent on the discharge of patient: 45 minutes  Patient was seen and examined on the date of discharge and determined to be suitable for discharge.         Joshua Weaver MD  Department of Hospital Medicine  [facility]

## 2019-12-09 NOTE — PLAN OF CARE
Safety measures maintained. VSS on RA. Pain mildly managed with PRN meds. Bed in lowest position, call light within reach, side rails upx2. Pt has no complaints at this time. Will continue to monitor.

## 2019-12-09 NOTE — PLAN OF CARE
The patient is AAOx4.  She reports unresolved acute episodes of abdominal pain with unproductive cough. Airway is open and patent, respirations are spontaneous, normal effort and rate noted, skin warm and dry, moves all extremities well, appearance: bed placed in low position, side rails up x 2, call light is within reach of patient  Explanation of care provided to patient, and  plan of care reviewed.

## 2019-12-10 NOTE — PLAN OF CARE
Safety measures maintained. VSS on RA. Pain mildly managed with PRN meds. Bed in lowest  position, call light within reach, side rails up x2. Pt has no complaints at this time. Will continue to monitor.

## 2019-12-10 NOTE — NURSING
Patient given discharge instructions and all questions were answered. IV removed and bandage applied.  Patient awaiting transportation out of facility. Will continue to monitor.

## 2019-12-11 LAB
BACTERIA SPEC AEROBE CULT: NORMAL
BACTERIA SPEC AEROBE CULT: NORMAL
GRAM STN SPEC: NORMAL

## 2019-12-13 PROBLEM — A41.9 SEPSIS: Status: RESOLVED | Noted: 2019-12-07 | Resolved: 2019-12-13

## 2019-12-19 PROBLEM — D64.9 ANEMIA: Status: ACTIVE | Noted: 2019-12-19

## 2019-12-26 ENCOUNTER — TELEPHONE (OUTPATIENT)
Dept: OTOLARYNGOLOGY | Facility: CLINIC | Age: 59
End: 2019-12-26

## 2019-12-26 NOTE — TELEPHONE ENCOUNTER
----- Message from Magdalena Lindsay sent at 12/24/2019 12:42 PM CST -----  Contact: Pt  Pt called to speak to the nurse regarding a referral to ENT which she has in the system and would like a call back to schedule a new pt appt. Pt can be reached at [phone]

## 2020-01-14 ENCOUNTER — CLINICAL SUPPORT (OUTPATIENT)
Dept: AUDIOLOGY | Facility: CLINIC | Age: 60
End: 2020-01-14
Payer: MEDICAID

## 2020-01-14 ENCOUNTER — OFFICE VISIT (OUTPATIENT)
Dept: OTOLARYNGOLOGY | Facility: CLINIC | Age: 60
End: 2020-01-14
Payer: MEDICAID

## 2020-01-14 ENCOUNTER — TELEPHONE (OUTPATIENT)
Dept: OTOLARYNGOLOGY | Facility: CLINIC | Age: 60
End: 2020-01-14

## 2020-01-14 VITALS — WEIGHT: 167.13 LBS | BODY MASS INDEX: 26.23 KG/M2 | HEIGHT: 67 IN

## 2020-01-14 DIAGNOSIS — H66.004 RECURRENT ACUTE SUPPURATIVE OTITIS MEDIA OF RIGHT EAR WITHOUT SPONTANEOUS RUPTURE OF TYMPANIC MEMBRANE: Primary | ICD-10-CM

## 2020-01-14 DIAGNOSIS — H91.90 HEARING LOSS, UNSPECIFIED HEARING LOSS TYPE, UNSPECIFIED LATERALITY: Primary | ICD-10-CM

## 2020-01-14 DIAGNOSIS — H92.01 OTALGIA, RIGHT: ICD-10-CM

## 2020-01-14 DIAGNOSIS — H90.41 SENSORINEURAL HEARING LOSS (SNHL) OF RIGHT EAR WITH UNRESTRICTED HEARING OF LEFT EAR: Primary | ICD-10-CM

## 2020-01-14 DIAGNOSIS — M26.621 TMJ TENDERNESS, RIGHT: ICD-10-CM

## 2020-01-14 PROCEDURE — 99999 PR PBB SHADOW E&M-EST. PATIENT-LVL II: ICD-10-PCS | Mod: PBBFAC,,, | Performed by: OTOLARYNGOLOGY

## 2020-01-14 PROCEDURE — 92557 COMPREHENSIVE HEARING TEST: CPT | Mod: PBBFAC | Performed by: AUDIOLOGIST

## 2020-01-14 PROCEDURE — 99203 PR OFFICE/OUTPT VISIT, NEW, LEVL III, 30-44 MIN: ICD-10-PCS | Mod: S$PBB,,, | Performed by: OTOLARYNGOLOGY

## 2020-01-14 PROCEDURE — 99212 OFFICE O/P EST SF 10 MIN: CPT | Mod: PBBFAC | Performed by: OTOLARYNGOLOGY

## 2020-01-14 PROCEDURE — 92567 TYMPANOMETRY: CPT | Mod: PBBFAC | Performed by: AUDIOLOGIST

## 2020-01-14 PROCEDURE — 99203 OFFICE O/P NEW LOW 30 MIN: CPT | Mod: S$PBB,,, | Performed by: OTOLARYNGOLOGY

## 2020-01-14 PROCEDURE — 99999 PR PBB SHADOW E&M-EST. PATIENT-LVL II: CPT | Mod: PBBFAC,,, | Performed by: OTOLARYNGOLOGY

## 2020-01-14 NOTE — LETTER
January 14, 2020      Dean Vidales MD  [address]  [place] [address]           [facility] - Otorhinolaryngology  [address]  [facility] [address]  Phone: [phone]  Fax: [phone]          Patient: Shreya Bae   MR Number: [id number]   YOB: 1960   Date of Visit: 1/14/2020       Dear Dr. Dean Vidales:    Thank you for referring Shreya Bae to me for evaluation. Attached you will find relevant portions of my assessment and plan of care.    If you have questions, please do not hesitate to call me. I look forward to following Shreya Bae along with you.    Sincerely,    Alex Ortega MD    Enclosure  CC:  No Recipients    If you would like to receive this communication electronically, please contact [email or web address] or [phone] to request more information on "[employer] Link access.    For providers and/or their staff who would like to refer a patient to [facility], please contact us through our one-stop-shop provider referral line, [facility], at [phone].    If you feel you have received this communication in error or would no longer like to receive these types of communications, please e-mail [email or web address]

## 2020-01-14 NOTE — PROGRESS NOTES
Shreya Bae was seen today in the clinic for an audiologic evaluation.  Patients main complaint was pain and hearing loss in her right ear.  Mrs. Bae reported that she was hospitalized with the flu over one month ago, at which time there was concern that the infection had spread into her right mastoid bone.  Pt still feels minor pain in the right ear, but it is significantly reduced since onset of issue.  Pt reported a history of dizziness that she treats with meclizine as needed.  Pt denied tinnitus.    Tympanometry revealed Type A in the right ear and Type A in the left ear.  Audiogram results revealed borderline normal to mild sloping to moderate sensorineural hearing loss (SNHL) at 8000 Hz in the right ear and essentially normal hearing in the left ear.  Speech reception thresholds were noted at 25 dB in the right ear and 20 dB in the left ear.  Speech discrimination scores were 100% in the right ear and 100% in the left ear.    Recommendations:  1. Otologic evaluation  2. Annual audiogram  3. Noise protection when in noise            
No

## 2020-01-15 NOTE — PROGRESS NOTES
Subjective:       Patient ID: Shreya Bae is a 59 y.o. female.    Chief Complaint: Otitis Media    HPI     Shreya Bae is a 59 y.o. female presents for evaluation after having a severe ear infection of the right ear 1 month ago that required hospital admission with IV antibiotics.  She reports she doing much better now, after antibiotic treatment.   She denies otorrhea, or hearing loss. She does have residual ear pain.     Review of Systems   Constitutional: Negative for chills, fever and unexpected weight change.   HENT: Negative for sore throat and trouble swallowing.    Eyes: Negative for pain and visual disturbance.   Respiratory: Negative for apnea and shortness of breath.    Cardiovascular: Negative for chest pain and palpitations.   Gastrointestinal: Negative for abdominal pain and nausea.   Endocrine: Negative for cold intolerance and heat intolerance.   Musculoskeletal: Negative for joint swelling and neck stiffness.   Skin: Negative for color change and rash.   Neurological: Negative for facial asymmetry and headaches.   Hematological: Negative for adenopathy. Does not bruise/bleed easily.   Psychiatric/Behavioral: Negative for agitation. The patient is not nervous/anxious.        Objective:      Physical Exam   Constitutional: She is oriented to person, place, and time. She appears well-developed and well-nourished. No distress.   HENT:   Head: Normocephalic and atraumatic.   Right Ear: Tympanic membrane, external ear and ear canal normal.   Left Ear: Tympanic membrane, external ear and ear canal normal.   Nose: Nose normal.   Mouth/Throat: Uvula is midline, oropharynx is clear and moist and mucous membranes are normal.   Gonzales: Midline  Rinne: AC > BC @ 512Hz     TMJ tenderness of right side   Eyes: Pupils are equal, round, and reactive to light. Conjunctivae and EOM are normal.   Neck: Normal range of motion. No tracheal deviation present. No thyromegaly present.   Cardiovascular: Normal rate  and regular rhythm.   Pulmonary/Chest: Effort normal. No respiratory distress.   Musculoskeletal: Normal range of motion.   Lymphadenopathy:        Head (right side): No submental, no submandibular and no tonsillar adenopathy present.        Head (left side): No submental, no submandibular and no tonsillar adenopathy present.     She has no cervical adenopathy.   Neurological: She is alert and oriented to person, place, and time.   Psychiatric: She has a normal mood and affect. Her behavior is normal.   Nursing note and vitals reviewed.      Data:      Audiogram tracings independently reviewed and discussed with patient shows borderline mild SNHL AU    Assessment:       1. Recurrent acute suppurative otitis media of right ear without spontaneous rupture of tympanic membrane    2. Otalgia, right    3. TMJ tenderness, right        Plan:         no evidence of residual infection on today's exam, audio testing shows no middle ear abnormality  Otalgia consistent with TMJ at this time    F/u PRN

## 2020-03-13 PROBLEM — Z87.448 HISTORY OF BLADDER SUSPENSION PROCEDURE: Status: ACTIVE | Noted: 2020-03-13

## 2020-03-13 PROBLEM — Z98.890 HISTORY OF BLADDER SUSPENSION PROCEDURE: Status: ACTIVE | Noted: 2020-03-13

## 2020-03-26 PROBLEM — Z98.84 HISTORY OF GASTRIC BYPASS: Status: ACTIVE | Noted: 2020-03-26

## 2020-05-21 ENCOUNTER — HISTORICAL (OUTPATIENT)
Dept: ADMINISTRATIVE | Facility: HOSPITAL | Age: 60
End: 2020-05-21

## 2020-05-21 LAB
ALBUMIN SERPL BCP-MCNC: 4.1 G/DL (ref 3.5–5)
ALBUMIN/GLOB SERPL ELPH: 1 {RATIO} (ref 1.5–2.2)
ALP SERPL-CCNC: 62 U/L (ref 45–117)
ALT SERPL W P-5'-P-CCNC: 24 U/L (ref 13–56)
ANION GAP SERPL CALC-SCNC: 15.5 MEQ/L (ref 10–20)
AST SERPL-CCNC: 35 U/L (ref 15–37)
BASOPHILS NFR BLD: 0.1 10 (ref 0–0.1)
BASOPHILS NFR BLD: 0.6 % (ref 0–1.5)
BILIRUB SERPL-MCNC: 0.27 MG/DL (ref 0.2–1)
BUN SERPL-MCNC: 13 MG/DL (ref 7–18)
CALCIUM SERPL-MCNC: 8.9 MG/DL (ref 8.5–10.1)
CHLORIDE SERPL-SCNC: 100 MMOL/L (ref 98–107)
CO2 SERPL-SCNC: 26 MMOL/L (ref 22–32)
CREAT SERPL-MCNC: 0.73 MG/DL (ref 0.55–1.02)
EGFR: 86 ML/MIN/1.73M
EOSINOPHIL NFR BLD: 0.1 10 (ref 0–0.7)
EOSINOPHIL NFR BLD: 1 % (ref 0–7)
ERYTHROCYTE [DISTWIDTH] IN BLOOD BY AUTOMATED COUNT: 14.6 % (ref 11.5–14.5)
GLOBULIN: 4.3 G/DL (ref 2.3–3.5)
GLUCOSE SERPL-MCNC: 81 MG/DL (ref 70–99)
GRAN #: 5.07 10 (ref 2–7.5)
GRAN%: 0.4 %
GRAN%: 46.8 % (ref 50–80)
HCT VFR BLD AUTO: 42.1 % (ref 37.7–47.9)
HGB BLD-MCNC: 14.7 G/DL (ref 12.2–16.2)
IMMATURE GRANULOCYTES #: 0.04 10
LYMPH #: 4.1 10 (ref 1–3.5)
LYMPH%: 37.7 % (ref 12–50)
MCH RBC QN AUTO: 32.6 PG (ref 27–31)
MCHC RBC AUTO-ENTMCNC: 34.9 G% (ref 32–35)
MCV RBC AUTO: 93.3 FL (ref 80–97)
MONO #: 1.5 10 (ref 0–0.8)
MONO%: 13.5 % (ref 0–12)
OSMOC: 275 MOSM/KG (ref 275–295)
PMV BLD AUTO: 10 FL (ref 7.4–10.4)
PMV BLD AUTO: 273 10 (ref 142–424)
POTASSIUM SERPL-SCNC: 3.5 MMOL/L (ref 3.5–5.1)
PROT SERPL-MCNC: 8.4 G/DL (ref 6.4–8.2)
RBC # BLD AUTO: 4.51 M/UL (ref 4.04–5.48)
SODIUM BLD-SCNC: 138 MMOL/L (ref 136–145)
WBC # BLD AUTO: 10.8 10 (ref 4–10.2)

## 2020-06-09 PROBLEM — I10 HTN (HYPERTENSION): Status: ACTIVE | Noted: 2020-06-09

## 2020-06-29 NOTE — ED NOTES
ENEIDA Galvan, performed rectal exam with myself at bedside. Negative for occult blood.   Subjective:      Jamie Kurtz is a 8 m.o. male here with mother. Patient brought in for Allergic Reaction (eye swelling/left side turned red)      History of Present Illness:  Pt was well until last nite  While playing on floor mat, noticed redness and swelling of eye and side of face  Given benadryl  No resp sx  Better today      Review of Systems   Constitutional: Negative for activity change, appetite change and crying.   HENT: Negative for congestion.    Eyes: Negative for discharge and redness.   Gastrointestinal: Negative for blood in stool, constipation, diarrhea and vomiting.   Genitourinary: Negative for hematuria.   Skin: Negative for rash.       Objective:     Physical Exam  Vitals signs and nursing note reviewed.   Constitutional:       General: He is active. He has a strong cry.      Appearance: He is well-developed.   HENT:      Head: Anterior fontanelle is flat.      Right Ear: Tympanic membrane normal.      Left Ear: Tympanic membrane normal.      Nose: Nose normal.      Mouth/Throat:      Mouth: Mucous membranes are moist.      Pharynx: Oropharynx is clear.   Eyes:      Conjunctiva/sclera: Conjunctivae normal.      Pupils: Pupils are equal, round, and reactive to light.   Neck:      Musculoskeletal: Normal range of motion and neck supple.   Cardiovascular:      Rate and Rhythm: Normal rate and regular rhythm.      Pulses: Pulses are strong.      Heart sounds: S1 normal and S2 normal.   Pulmonary:      Effort: Pulmonary effort is normal.      Breath sounds: Normal breath sounds.   Abdominal:      General: Bowel sounds are normal.      Palpations: Abdomen is soft.   Musculoskeletal: Normal range of motion.   Skin:     General: Skin is warm and moist.   Neurological:      Mental Status: He is alert.         Assessment:      allergic rxn    Plan:         Patient Instructions   discussed benadryl  Reviewed resp distress

## 2020-11-20 PROBLEM — Z90.81 HX OF SPLENECTOMY: Status: ACTIVE | Noted: 2020-11-20

## 2021-03-23 ENCOUNTER — ANESTHESIA EVENT (OUTPATIENT)
Dept: SURGERY | Facility: HOSPITAL | Age: 61
End: 2021-03-23
Payer: MEDICAID

## 2021-03-26 ENCOUNTER — HOSPITAL ENCOUNTER (OUTPATIENT)
Facility: HOSPITAL | Age: 61
Discharge: HOME | End: 2021-03-26
Attending: PODIATRIST | Admitting: PODIATRIST
Payer: MEDICAID

## 2021-03-26 ENCOUNTER — ANESTHESIA (OUTPATIENT)
Dept: SURGERY | Facility: HOSPITAL | Age: 61
End: 2021-03-26
Payer: MEDICAID

## 2021-03-26 VITALS
SYSTOLIC BLOOD PRESSURE: 132 MMHG | OXYGEN SATURATION: 95 % | BODY MASS INDEX: 27.31 KG/M2 | HEART RATE: 53 BPM | TEMPERATURE: 98 F | DIASTOLIC BLOOD PRESSURE: 62 MMHG | HEIGHT: 67 IN | RESPIRATION RATE: 16 BRPM | WEIGHT: 174 LBS

## 2021-03-26 DIAGNOSIS — M21.629 BUNIONETTE: ICD-10-CM

## 2021-03-26 LAB — SARS-COV-2 RDRP RESP QL NAA+PROBE: NEGATIVE

## 2021-03-26 PROCEDURE — 63600175 PHARM REV CODE 636 W HCPCS: Performed by: STUDENT IN AN ORGANIZED HEALTH CARE EDUCATION/TRAINING PROGRAM

## 2021-03-26 PROCEDURE — 27201423 OPTIME MED/SURG SUP & DEVICES STERILE SUPPLY: Performed by: PODIATRIST

## 2021-03-26 PROCEDURE — 71000015 HC POSTOP RECOV 1ST HR: Performed by: PODIATRIST

## 2021-03-26 PROCEDURE — 37000008 HC ANESTHESIA 1ST 15 MINUTES: Performed by: PODIATRIST

## 2021-03-26 PROCEDURE — 36000708 HC OR TIME LEV III 1ST 15 MIN: Performed by: PODIATRIST

## 2021-03-26 PROCEDURE — 63600175 PHARM REV CODE 636 W HCPCS: Performed by: PODIATRIST

## 2021-03-26 PROCEDURE — 36000709 HC OR TIME LEV III EA ADD 15 MIN: Performed by: PODIATRIST

## 2021-03-26 PROCEDURE — 25000003 PHARM REV CODE 250: Performed by: STUDENT IN AN ORGANIZED HEALTH CARE EDUCATION/TRAINING PROGRAM

## 2021-03-26 PROCEDURE — 71000016 HC POSTOP RECOV ADDL HR: Performed by: PODIATRIST

## 2021-03-26 PROCEDURE — U0002 COVID-19 LAB TEST NON-CDC: HCPCS | Performed by: PODIATRIST

## 2021-03-26 PROCEDURE — 01480 ANES OPEN PX LOWER L/A/F NOS: CPT | Performed by: PODIATRIST

## 2021-03-26 PROCEDURE — 25000003 PHARM REV CODE 250: Performed by: PODIATRIST

## 2021-03-26 PROCEDURE — C1769 GUIDE WIRE: HCPCS | Performed by: PODIATRIST

## 2021-03-26 PROCEDURE — C1713 ANCHOR/SCREW BN/BN,TIS/BN: HCPCS | Performed by: PODIATRIST

## 2021-03-26 PROCEDURE — 37000009 HC ANESTHESIA EA ADD 15 MINS: Performed by: PODIATRIST

## 2021-03-26 DEVICE — IMPLANTABLE DEVICE: Type: IMPLANTABLE DEVICE | Site: FOOT | Status: FUNCTIONAL

## 2021-03-26 RX ORDER — SODIUM CHLORIDE, SODIUM LACTATE, POTASSIUM CHLORIDE, CALCIUM CHLORIDE 600; 310; 30; 20 MG/100ML; MG/100ML; MG/100ML; MG/100ML
INJECTION, SOLUTION INTRAVENOUS CONTINUOUS PRN
Status: DISCONTINUED | OUTPATIENT
Start: 2021-03-26 | End: 2021-03-26

## 2021-03-26 RX ORDER — LIDOCAINE HYDROCHLORIDE 10 MG/ML
INJECTION, SOLUTION EPIDURAL; INFILTRATION; INTRACAUDAL; PERINEURAL
Status: DISCONTINUED | OUTPATIENT
Start: 2021-03-26 | End: 2021-03-26 | Stop reason: HOSPADM

## 2021-03-26 RX ORDER — OXYCODONE AND ACETAMINOPHEN 10; 325 MG/1; MG/1
1 TABLET ORAL EVERY 6 HOURS PRN
Status: DISCONTINUED | OUTPATIENT
Start: 2021-03-26 | End: 2021-03-26 | Stop reason: HOSPADM

## 2021-03-26 RX ORDER — MIDAZOLAM HYDROCHLORIDE 1 MG/ML
INJECTION INTRAMUSCULAR; INTRAVENOUS
Status: DISCONTINUED | OUTPATIENT
Start: 2021-03-26 | End: 2021-03-26

## 2021-03-26 RX ORDER — CEFAZOLIN SODIUM 2 G/50ML
2 SOLUTION INTRAVENOUS ONCE
Status: COMPLETED | OUTPATIENT
Start: 2021-03-26 | End: 2021-03-26

## 2021-03-26 RX ORDER — HYDROMORPHONE HYDROCHLORIDE 2 MG/ML
0.5 INJECTION, SOLUTION INTRAMUSCULAR; INTRAVENOUS; SUBCUTANEOUS EVERY 5 MIN PRN
Status: DISCONTINUED | OUTPATIENT
Start: 2021-03-26 | End: 2021-03-26 | Stop reason: HOSPADM

## 2021-03-26 RX ORDER — LIDOCAINE HYDROCHLORIDE 10 MG/ML
1 INJECTION, SOLUTION EPIDURAL; INFILTRATION; INTRACAUDAL; PERINEURAL ONCE
Status: ACTIVE | OUTPATIENT
Start: 2021-03-26

## 2021-03-26 RX ORDER — FENTANYL CITRATE 50 UG/ML
INJECTION, SOLUTION INTRAMUSCULAR; INTRAVENOUS
Status: DISCONTINUED | OUTPATIENT
Start: 2021-03-26 | End: 2021-03-26

## 2021-03-26 RX ORDER — SODIUM CHLORIDE 0.9 % (FLUSH) 0.9 %
10 SYRINGE (ML) INJECTION
Status: DISCONTINUED | OUTPATIENT
Start: 2021-03-26 | End: 2021-03-26 | Stop reason: HOSPADM

## 2021-03-26 RX ORDER — KETAMINE HCL IN 0.9 % NACL 50 MG/5 ML
SYRINGE (ML) INTRAVENOUS
Status: DISCONTINUED | OUTPATIENT
Start: 2021-03-26 | End: 2021-03-26

## 2021-03-26 RX ORDER — ROPIVACAINE HYDROCHLORIDE 5 MG/ML
INJECTION, SOLUTION EPIDURAL; INFILTRATION; PERINEURAL
Status: DISCONTINUED | OUTPATIENT
Start: 2021-03-26 | End: 2021-03-26 | Stop reason: HOSPADM

## 2021-03-26 RX ORDER — EPHEDRINE SULFATE 50 MG/ML
INJECTION, SOLUTION INTRAVENOUS
Status: DISCONTINUED | OUTPATIENT
Start: 2021-03-26 | End: 2021-03-26

## 2021-03-26 RX ORDER — SODIUM CHLORIDE, SODIUM LACTATE, POTASSIUM CHLORIDE, CALCIUM CHLORIDE 600; 310; 30; 20 MG/100ML; MG/100ML; MG/100ML; MG/100ML
INJECTION, SOLUTION INTRAVENOUS CONTINUOUS
Status: ACTIVE | OUTPATIENT
Start: 2021-03-26

## 2021-03-26 RX ORDER — PROPOFOL 10 MG/ML
VIAL (ML) INTRAVENOUS CONTINUOUS PRN
Status: DISCONTINUED | OUTPATIENT
Start: 2021-03-26 | End: 2021-03-26

## 2021-03-26 RX ADMIN — FENTANYL CITRATE 25 MCG: 50 INJECTION, SOLUTION INTRAMUSCULAR; INTRAVENOUS at 11:03

## 2021-03-26 RX ADMIN — CEFAZOLIN SODIUM 2 G: 2 SOLUTION INTRAVENOUS at 11:03

## 2021-03-26 RX ADMIN — OXYCODONE HYDROCHLORIDE AND ACETAMINOPHEN 1 TABLET: 10; 325 TABLET ORAL at 01:03

## 2021-03-26 RX ADMIN — EPHEDRINE SULFATE 12.5 MG: 50 INJECTION, SOLUTION INTRAMUSCULAR; INTRAVENOUS; SUBCUTANEOUS at 12:03

## 2021-03-26 RX ADMIN — SODIUM CHLORIDE, SODIUM LACTATE, POTASSIUM CHLORIDE, AND CALCIUM CHLORIDE: .6; .31; .03; .02 INJECTION, SOLUTION INTRAVENOUS at 11:03

## 2021-03-26 RX ADMIN — MIDAZOLAM HYDROCHLORIDE 2 MG: 1 INJECTION, SOLUTION INTRAMUSCULAR; INTRAVENOUS at 11:03

## 2021-03-26 RX ADMIN — PROPOFOL 300 MCG/KG/MIN: 10 INJECTION, EMULSION INTRAVENOUS at 11:03

## 2021-03-26 RX ADMIN — Medication 30 MG: at 11:03

## 2021-05-04 ENCOUNTER — PATIENT MESSAGE (OUTPATIENT)
Dept: RESEARCH | Facility: HOSPITAL | Age: 61
End: 2021-05-04

## 2022-02-20 ENCOUNTER — HOSPITAL ENCOUNTER (EMERGENCY)
Facility: HOSPITAL | Age: 62
Discharge: HOME | End: 2022-02-20
Attending: STUDENT IN AN ORGANIZED HEALTH CARE EDUCATION/TRAINING PROGRAM
Payer: MEDICAID

## 2022-02-20 VITALS
RESPIRATION RATE: 18 BRPM | DIASTOLIC BLOOD PRESSURE: 73 MMHG | WEIGHT: 198.81 LBS | HEART RATE: 69 BPM | TEMPERATURE: 98 F | OXYGEN SATURATION: 100 % | SYSTOLIC BLOOD PRESSURE: 157 MMHG | BODY MASS INDEX: 31.14 KG/M2

## 2022-02-20 DIAGNOSIS — R06.02 SOB (SHORTNESS OF BREATH): ICD-10-CM

## 2022-02-20 DIAGNOSIS — J44.1 COPD WITH ACUTE EXACERBATION: Primary | ICD-10-CM

## 2022-02-20 LAB
BASOPHILS # BLD AUTO: 0.13 K/UL (ref 0–0.2)
BASOPHILS NFR BLD: 1.2 % (ref 0–1.9)
CTP QC/QA: YES
DIFFERENTIAL METHOD: ABNORMAL
EOSINOPHIL # BLD AUTO: 0.8 K/UL (ref 0–0.5)
EOSINOPHIL NFR BLD: 7.8 % (ref 0–8)
ERYTHROCYTE [DISTWIDTH] IN BLOOD BY AUTOMATED COUNT: 15.4 % (ref 11.5–14.5)
HCT VFR BLD AUTO: 40.6 % (ref 37–48.5)
HGB BLD-MCNC: 14.2 G/DL (ref 12–16)
IMM GRANULOCYTES # BLD AUTO: 0.05 K/UL (ref 0–0.04)
IMM GRANULOCYTES NFR BLD AUTO: 0.5 % (ref 0–0.5)
LYMPHOCYTES # BLD AUTO: 3.1 K/UL (ref 1–4.8)
LYMPHOCYTES NFR BLD: 29.5 % (ref 18–48)
MCH RBC QN AUTO: 32.3 PG (ref 27–31)
MCHC RBC AUTO-ENTMCNC: 35 G/DL (ref 32–36)
MCV RBC AUTO: 93 FL (ref 82–98)
MONOCYTES # BLD AUTO: 1.1 K/UL (ref 0.3–1)
MONOCYTES NFR BLD: 10.9 % (ref 4–15)
NEUTROPHILS # BLD AUTO: 5.2 K/UL (ref 1.8–7.7)
NEUTROPHILS NFR BLD: 50.1 % (ref 38–73)
NRBC BLD-RTO: 0 /100 WBC
NT-PROBNP SERPL-MCNC: 168 PG/ML (ref 5–900)
PLATELET # BLD AUTO: 397 K/UL (ref 150–450)
PMV BLD AUTO: 9.6 FL (ref 9.2–12.9)
RBC # BLD AUTO: 4.39 M/UL (ref 4–5.4)
SARS-COV-2 RDRP RESP QL NAA+PROBE: NEGATIVE
TROPONIN I SERPL DL<=0.01 NG/ML-MCNC: 25.4 PG/ML (ref 0–60)
WBC # BLD AUTO: 10.47 K/UL (ref 3.9–12.7)

## 2022-02-20 PROCEDURE — 93010 ELECTROCARDIOGRAM REPORT: CPT | Mod: ,,, | Performed by: INTERNAL MEDICINE

## 2022-02-20 PROCEDURE — 63600175 PHARM REV CODE 636 W HCPCS: Performed by: STUDENT IN AN ORGANIZED HEALTH CARE EDUCATION/TRAINING PROGRAM

## 2022-02-20 PROCEDURE — 25000242 PHARM REV CODE 250 ALT 637 W/ HCPCS: Performed by: STUDENT IN AN ORGANIZED HEALTH CARE EDUCATION/TRAINING PROGRAM

## 2022-02-20 PROCEDURE — 93005 ELECTROCARDIOGRAM TRACING: CPT

## 2022-02-20 PROCEDURE — 94761 N-INVAS EAR/PLS OXIMETRY MLT: CPT

## 2022-02-20 PROCEDURE — 93010 EKG 12-LEAD: ICD-10-PCS | Mod: ,,, | Performed by: INTERNAL MEDICINE

## 2022-02-20 PROCEDURE — 83880 ASSAY OF NATRIURETIC PEPTIDE: CPT | Performed by: STUDENT IN AN ORGANIZED HEALTH CARE EDUCATION/TRAINING PROGRAM

## 2022-02-20 PROCEDURE — 85025 COMPLETE CBC W/AUTO DIFF WBC: CPT | Performed by: STUDENT IN AN ORGANIZED HEALTH CARE EDUCATION/TRAINING PROGRAM

## 2022-02-20 PROCEDURE — 99285 EMERGENCY DEPT VISIT HI MDM: CPT | Mod: 25

## 2022-02-20 PROCEDURE — U0002 COVID-19 LAB TEST NON-CDC: HCPCS | Performed by: STUDENT IN AN ORGANIZED HEALTH CARE EDUCATION/TRAINING PROGRAM

## 2022-02-20 PROCEDURE — 94640 AIRWAY INHALATION TREATMENT: CPT

## 2022-02-20 PROCEDURE — 84484 ASSAY OF TROPONIN QUANT: CPT | Performed by: STUDENT IN AN ORGANIZED HEALTH CARE EDUCATION/TRAINING PROGRAM

## 2022-02-20 PROCEDURE — 99900031 HC PATIENT EDUCATION (STAT)

## 2022-02-20 PROCEDURE — 36415 COLL VENOUS BLD VENIPUNCTURE: CPT | Performed by: STUDENT IN AN ORGANIZED HEALTH CARE EDUCATION/TRAINING PROGRAM

## 2022-02-20 PROCEDURE — 99900035 HC TECH TIME PER 15 MIN (STAT)

## 2022-02-20 RX ORDER — PREDNISONE 20 MG/1
60 TABLET ORAL
Status: COMPLETED | OUTPATIENT
Start: 2022-02-20 | End: 2022-02-20

## 2022-02-20 RX ORDER — PREDNISONE 50 MG/1
50 TABLET ORAL DAILY
Qty: 5 TABLET | Refills: 0 | Status: SHIPPED | OUTPATIENT
Start: 2022-02-20 | End: 2022-02-25

## 2022-02-20 RX ORDER — IPRATROPIUM BROMIDE AND ALBUTEROL SULFATE 2.5; .5 MG/3ML; MG/3ML
3 SOLUTION RESPIRATORY (INHALATION)
Status: COMPLETED | OUTPATIENT
Start: 2022-02-20 | End: 2022-02-20

## 2022-02-20 RX ADMIN — IPRATROPIUM BROMIDE AND ALBUTEROL SULFATE 3 ML: 2.5; .5 SOLUTION RESPIRATORY (INHALATION) at 02:02

## 2022-02-20 RX ADMIN — PREDNISONE 60 MG: 20 TABLET ORAL at 01:02

## 2022-02-20 NOTE — ED PROVIDER NOTES
Encounter Date: 2022       History     Chief Complaint   Patient presents with    Shortness of Breath     SOB and cough x 1 month; worsening over last couple of days. Pt states she was recently treated for pneumonia and that another physician told her he believes she has pleural effusion.      61-year-old female with history of smoking, hypertension high cholesterol presents with shortness of breath and cough for over 1 month.  Patient said that symptoms started during the UE use and patient has been treated with levofloxacin and azithromycin with steroids 20 mg without resolution of symptoms.  Patient says that symptoms continues so patient wants an answer.  Patient has not had any blood work done.  Denies any chest pain, vomiting, diarrhea.  Patient said that shortness of breath worse when exertion and deep breaths.        Review of patient's allergies indicates:   Allergen Reactions    Ciprofloxacin Hives    Nsaids (non-steroidal anti-inflammatory drug) Other (See Comments)     Sensitive stomach, has had GI bleed.  AVM's.  Only short term use okay.    Phenobarbital Hives    Statins-hmg-coa reductase inhibitors     Sulfa (sulfonamide antibiotics) Hives    Tramadol      Past Medical History:   Diagnosis Date    Autoimmune disease     Autoimmune hepatitis     Bipolar 1 disorder     Depression     DVT (deep vein thrombosis) in pregnancy     GERD (gastroesophageal reflux disease)     History of GI bleed     Insomnia     Neuromuscular disorder     PE (pulmonary thromboembolism) 2010    Scleromyxedema     Thyroid disease      Past Surgical History:   Procedure Laterality Date    ABDOMINAL SURGERY      APPENDECTOMY      BACK SURGERY      BLADDER SUSPENSION      CARPAL TUNNEL RELEASE Bilateral     CERVICAL FUSION       SECTION      CHOLECYSTECTOMY      COLONOSCOPY N/A 2019    Procedure: COLONOSCOPY;  Surgeon: Jose M Luis MD;  Location: [facility];  Service:  Endoscopy;  Laterality: N/A;    CORRECTION OF HAMMER TOE Left 3/26/2021    Procedure: CORRECTION, HAMMER TOE;  Surgeon: Hansel Ybarra DPM;  Location: [facility] OR;  Service: Podiatry;  Laterality: Left;    ESOPHAGOGASTRODUODENOSCOPY N/A 2019    Procedure: EGD (ESOPHAGOGASTRODUODENOSCOPY);  Surgeon: Jose M Luis MD;  Location: [facility];  Service: Endoscopy;  Laterality: N/A;    FOOT ARTHRODESIS Left 3/26/2021    Procedure: FUSION, FOOT;  Surgeon: Hansel Ybarra DPM;  Location: [facility] OR;  Service: Podiatry;  Laterality: Left;  Anesthesia  Stephen confirmed 3/25/21 [place]    GASTRIC BYPASS      GABRIEL FILTER PLACEMENT      HERNIA REPAIR      HYSTERECTOMY      INCONTINENCE SURGERY  02/10/2020    LUMBAR FUSION      NECK SURGERY      spleenectomy      MELVIN BUNIONECTOMY Left 3/26/2021    Procedure: EXCISION, BUNIONETTE;  Surgeon: Hansel Ybarra DPM;  Location: [facility] OR;  Service: Podiatry;  Laterality: Left;    THYROID CYST EXCISION  1982    TONSILLECTOMY       Family History   Problem Relation Age of Onset    Colon cancer Mother 70        Colon Cancer    Breast cancer Mother     Colon cancer Father 55        Colon Cancer    Breast cancer Sister     Breast cancer Maternal Grandmother     Breast cancer Paternal Grandmother      Social History     Tobacco Use    Smoking status: Former Smoker     Packs/day: 0.50     Years: 40.00     Pack years: 20.00     Types: Cigarettes     Quit date: 3/19/2019     Years since quittin.9    Smokeless tobacco: Current User   Substance Use Topics    Alcohol use: Yes     Comment: Socially    Drug use: No     Review of Systems   Constitutional: Negative.    HENT: Negative.    Respiratory: Positive for cough and shortness of breath.    Cardiovascular: Negative.    Gastrointestinal: Negative.    Genitourinary: Negative.    Musculoskeletal: Negative.    Skin: Negative.    Neurological: Negative.    Psychiatric/Behavioral: Negative.    All other systems  reviewed and are negative.      Physical Exam     Initial Vitals [02/20/22 1231]   BP Pulse Resp Temp SpO2   (!) 157/73 67 18 98 °F (36.7 °C) 95 %      MAP       --         Physical Exam    Nursing note and vitals reviewed.  Constitutional: Vital signs are normal. She appears well-developed and well-nourished.   HENT:   Head: Normocephalic and atraumatic.   Eyes: Conjunctivae and lids are normal.   Neck: Trachea normal. Neck supple.   Cardiovascular: Normal rate, regular rhythm, normal heart sounds, intact distal pulses and normal pulses.   No murmur heard.  Pulmonary/Chest:   Speaking in full sentences.  Moving air with expiratory and inspiratory wheezing   Abdominal: Abdomen is soft. Bowel sounds are normal.   Musculoskeletal:         General: Normal range of motion.      Cervical back: Neck supple.     Neurological: She is alert and oriented to person, place, and time. She has normal strength. No cranial nerve deficit or sensory deficit.   Skin: Skin is warm. Capillary refill takes less than 2 seconds.   Psychiatric: She has a normal mood and affect. Her speech is normal. Thought content normal.         ED Course   Procedures  Labs Reviewed   CBC W/ AUTO DIFFERENTIAL - Abnormal; Notable for the following components:       Result Value    MCH 32.3 (*)     RDW 15.4 (*)     Immature Grans (Abs) 0.05 (*)     Mono # 1.1 (*)     Eos # 0.8 (*)     All other components within normal limits   TROPONIN I HIGH SENSITIVITY   NT-PRO NATRIURETIC PEPTIDE   SARS-COV-2 RDRP GENE    Narrative:     This test utilizes isothermal nucleic acid amplification   technology to detect the SARS-CoV-2 RdRp nucleic acid segment.   The analytical sensitivity (limit of detection) is 125 genome   equivalents/mL.   A POSITIVE result implies infection with the SARS-CoV-2 virus;   the patient is presumed to be contagious.     A NEGATIVE result means that SARS-CoV-2 nucleic acids are not   present above the limit of detection. A NEGATIVE result  should be   treated as presumptive. It does not rule out the possibility of   COVID-19 and should not be the sole basis for treatment decisions.   If COVID-19 is strongly suspected based on clinical and exposure   history, re-testing using an alternate molecular assay should be   considered.   This test is only for use under the Food and Drug   Administration s Emergency Use Authorization (EUA).   Commercial kits are provided by [facility].   Performance characteristics of the EUA have been independently   verified by [facility] Department of   Pathology and Laboratory Medicine.   _________________________________________________________________   The authorized Fact Sheet for Healthcare Providers and the authorized Fact   Sheet for Patients of the ID NOW COVID-19 are available on the FDA   website:     https://www.fda.gov/media/506034/download  https://www.fda.gov/media/541476/download             EKG Readings: (Independently Interpreted)   Initial Reading: No STEMI. Rhythm: Sinus Bradycardia. Ectopy: No Ectopy. Axis: Right Axis Deviation.       Imaging Results          X-Ray Chest 1 View (In process)                  Medications   albuterol-ipratropium 2.5 mg-0.5 mg/3 mL nebulizer solution 3 mL (3 mLs Nebulization Given 2/20/22 1411)   predniSONE tablet 60 mg (60 mg Oral Given 2/20/22 1300)     Medical Decision Making:   Initial Assessment:   61-year-old female with history of smoking, hypertension high cholesterol presents with shortness of breath and cough for over 1 month.  Afebrile vitals stable.  Most likely COPD exacerbation.  Will treat.  Rule patient out for CHF pleural effusion, pneumonia.  Re-evaluate  Clinical Tests:   Lab Tests: Ordered and Reviewed  The following lab test(s) were unremarkable: CBC, CMP, Troponin and BNP  Radiological Study: Ordered and Reviewed  Medical Tests: Ordered and Reviewed             ED Course as of 02/20/22 1432   Sun Feb 20, 2022   1410 Labs and imaging  noted.  X-ray comparable to the for.  Will advise patient to follow-up for COPD management with pulmonologist.  Give 5 days of steroids.  Patient already has nebulizer at home. [HD]      ED Course User Index  [HD] Aydin Fletcher MD             Clinical Impression:   Final diagnoses:  [R06.02] SOB (shortness of breath)  [J44.1] COPD with acute exacerbation (Primary)          ED Disposition Condition    Discharge Stable        ED Prescriptions     Medication Sig Dispense Start Date End Date Auth. Provider    predniSONE (DELTASONE) 50 MG Tab Take 1 tablet (50 mg total) by mouth once daily. for 5 days 5 tablet 2/20/2022 2/25/2022 Aydin Fletcher MD        Follow-up Information     Follow up With Specialties Details Why Contact Info    Dean Vidales MD Family Medicine  As needed, If symptoms worsen [address] [facility]  [place] [address]  [phone]             Aydin Fletcher MD  02/20/22 0641

## 2022-03-19 PROBLEM — D72.19 OTHER EOSINOPHILIA: Status: ACTIVE | Noted: 2022-03-19

## 2022-03-20 PROBLEM — J45.901 ASTHMA EXACERBATION: Status: ACTIVE | Noted: 2022-03-20

## 2022-03-21 PROBLEM — D72.19 OTHER EOSINOPHILIA: Status: RESOLVED | Noted: 2022-03-19 | Resolved: 2022-03-21

## 2022-03-21 PROBLEM — J45.901 ASTHMA EXACERBATION: Status: RESOLVED | Noted: 2022-03-20 | Resolved: 2022-03-21

## 2022-05-04 DIAGNOSIS — Z12.31 OTHER SCREENING MAMMOGRAM: ICD-10-CM

## 2022-05-23 ENCOUNTER — SPECIALTY PHARMACY (OUTPATIENT)
Dept: PHARMACY | Facility: CLINIC | Age: 62
End: 2022-05-23
Payer: MEDICAID

## 2022-05-25 ENCOUNTER — SPECIALTY PHARMACY (OUTPATIENT)
Dept: PHARMACY | Facility: CLINIC | Age: 62
End: 2022-05-25
Payer: MEDICAID

## 2022-05-25 NOTE — TELEPHONE ENCOUNTER
Provider agreed to switch to Fasenra.     Closing referral for Nucala and opening referral for Fasenra.

## 2022-05-25 NOTE — TELEPHONE ENCOUNTER
CARL ROJO Key: [id number] - PA Case ID: [id number]    PA for Fasenra submitted via [facility] on 5/25

## 2022-05-26 ENCOUNTER — SPECIALTY PHARMACY (OUTPATIENT)
Dept: PHARMACY | Facility: CLINIC | Age: 62
End: 2022-05-26
Payer: MEDICAID

## 2022-05-26 DIAGNOSIS — J45.50 SEVERE PERSISTENT ASTHMA IN ADULT WITHOUT COMPLICATION: Primary | ICD-10-CM

## 2022-05-26 NOTE — TELEPHONE ENCOUNTER
Specialty Pharmacy - Initial Clinical Assessment    Specialty Medication Orders Linked to Encounter    Flowsheet Row Most Recent Value   Medication #1 benralizumab (FASENRA PEN) 30 mg/mL AtIn (Order#[id number], Rx#[id number])        Patient Diagnosis   J45.50 - Severe persistent asthma in adult without complication    Subjective    Shreya Bae is a 61 y.o. female, who is followed by the specialty pharmacy service for management and education.    Recent Encounters     Date Type Provider Description    05/26/2022 Specialty Pharmacy Isidro Vinson PharmD Initial Clinical Assessment    05/25/2022 Specialty Pharmacy Isidro Vinson PharmD Referral Authorization    05/23/2022 Specialty Pharmacy Isidro Vinson PharmD Referral Authorization        Clinical call attempts since last clinical assessment   5/26/2022  4:42 PM - Specialty Pharmacy - Clinical Assessment by Isidro Vinson PharmD     Current Outpatient Medications   Medication Sig    albuterol (VENTOLIN HFA) 90 mcg/actuation inhaler Inhale 2 puffs into the lungs every 6 (six) hours as needed for Wheezing or Shortness of Breath. Rescue    amitriptyline (ELAVIL) 50 MG tablet Take 50 mg by mouth nightly.    amLODIPine (NORVASC) 10 MG tablet Take 1 tablet (10 mg total) by mouth once daily.    benralizumab (FASENRA PEN) 30 mg/mL AtIn Inject 30 mg into the skin every 28 days. Please instruct patient to call clinic for administration of first injection    benralizumab (FASENRA PEN) 30 mg/mL AtIn Inject 30 mg into the skin every 8 weeks.    dextromethorphan-guaiFENesin  mg/5 ml (ROBITUSSIN-DM)  mg/5 mL liquid Take 10 mLs by mouth every 6 (six) hours.    fenofibrate (TRICOR) 48 MG tablet Take 1 tablet (48 mg total) by mouth once daily.    fluticasone propionate (FLOVENT HFA) 220 mcg/actuation inhaler Inhale 1 puff into the lungs 2 (two) times daily. Controller    furosemide (LASIX) 20 MG tablet Take 1 tablet (20 mg total) by mouth once daily. Take 2 tablets  daily x3-5 days then 1 tablet daily    levothyroxine (SYNTHROID) 75 MCG tablet Take 1 tablet (75 mcg total) by mouth once daily.    lithium 600 MG capsule Take 1,200 mg by mouth every evening.     metoprolol tartrate (LOPRESSOR) 50 MG tablet Take 1 tablet (50 mg total) by mouth 2 (two) times daily.    pantoprazole (PROTONIX) 40 MG tablet Take 1 tablet (40 mg total) by mouth once daily.    predniSONE (DELTASONE) 10 MG tablet Take 4 tablets by mouth x3 days, then 3 tablets x3 days, then 2 tablets x3 days, then 1 tablet x3 days    trazodone (DESYREL) 100 MG tablet Take 200 mg by mouth every evening.     traZODone (DESYREL) 100 MG tablet 2 tablets at bedtime    umeclidinium-vilanteroL (ANORO ELLIPTA) 62.5-25 mcg/actuation DsDv Inhale 1 puff into the lungs once daily. Controller   Last reviewed on 5/26/2022 12:11 PM by Isidro Vinson, PharmD    Review of patient's allergies indicates:   Allergen Reactions    Ciprofloxacin Hives    Nsaids (non-steroidal anti-inflammatory drug) Other (See Comments)     Sensitive stomach, has had GI bleed.  AVM's.  Only short term use okay.    Phenobarbital Hives    Statins-hmg-coa reductase inhibitors     Sulfa (sulfonamide antibiotics) Hives    Tramadol    Last reviewed on  5/26/2022 11:55 AM by Isidro Vinson    Drug Interactions    Drug interactions evaluated: yes  Clinically relevant drug interactions identified: no  Provided the patient with educational material regarding drug interactions: not applicable         Adverse Effects    *All other systems reviewed and are negative       Assessment Questions - Documented Responses    Flowsheet Row Most Recent Value   Assessment    Medication Reconciliation completed for patient Yes   During the past 4 weeks, has patient missed any activities due to condition or medication? No   During the past 4 weeks, did patient have any of the following urgent care visits? None   Goals of Therapy Status Discussed (new start)   Status of the  patients ability to self-administer: Is Able   All education points have been covered with patient? Yes, supplemental printed education provided   Welcome packet contents reviewed and discussed with patient? Yes   Assesment completed? Yes   Plan Therapy being initiated   Do you need to open a clinical intervention (i-vent)? No   Do you want to schedule first shipment? Yes   Medication #1 Assessment Info    Patient status New medication, New to [facility]   Is this medication appropriate for the patient? Yes   Is this medication effective? Not yet started        Refill Questions - Documented Responses    Flowsheet Row Most Recent Value   Patient Availability and HIPAA Verification    Does patient want to proceed with activity? Yes   HIPAA/medical authority confirmed? Yes   Relationship to patient of person spoken to? Self   Refill Screening Questions    When does the patient need to receive the medication? 05/27/22   Refill Delivery Questions    How will the patient receive the medication? Delivery Tiffany   When does the patient need to receive the medication? 05/27/22   Shipping Address Home   Address in [facility] confirmed and updated if neccessary? Yes   Expected Copay ($) 0   Is the patient able to afford the medication copay? Yes   Payment Method zero copay   Days supply of Refill 28   Supplies needed? No supplies needed   Refill activity completed? Yes   Refill activity plan Refill scheduled   Shipment/Pickup Date: 05/27/22          Objective    She has a past medical history of Asthma, Autoimmune disease, Autoimmune hepatitis, Bipolar 1 disorder, Depression, DVT (deep vein thrombosis) in pregnancy, GERD (gastroesophageal reflux disease), History of GI bleed, Insomnia, Neuromuscular disorder, PE (pulmonary thromboembolism) (2010), Scleromyxedema, and Thyroid disease.    Tried/failed medications: Anoro, prednisone, Flovent, albuterol    BP Readings from Last 4 Encounters:   05/11/22 (!) 130/54   03/25/22 122/68  "  03/21/22 (!) 160/72   03/17/22 (!) 141/63     Ht Readings from Last 4 Encounters:   05/11/22 5' 7" (1.702 m)   03/25/22 5' 7" (1.702 m)   03/17/22 5' 7" (1.702 m)   03/17/22 5' 7" (1.702 m)     Wt Readings from Last 4 Encounters:   05/11/22 93.9 kg (207 lb)   03/25/22 91.2 kg (201 lb 1.6 oz)   03/17/22 91.2 kg (201 lb)   03/17/22 91.2 kg (201 lb)       The goals of prescribed drug therapy management include:  · Supporting patient to meet the prescriber's medical treatment objectives  · Improving or maintaining quality of life  · Maintaining optimal therapy adherence  · Minimizing and managing side effects      Goals of Therapy Status: Discussed (new start)    Assessment/Plan  Patient plans to start therapy on 05/27/22      Indication, dosage, appropriateness, effectiveness, safety and convenience of her specialty medication(s) were reviewed today.     Patient Education   Patient received education on the following:    Expectations and possible outcomes of therapy   Proper use, timely administration, and missed dose management   Duration of therapy   Side effects, including prevention, minimization, and management   Contraindications and safety precautions   New or changed medications, including prescribe and over the counter medications and supplements   Reviews recommended vaccinations, as appropriate   Storage, safe handling, and disposal    Aware to call clinic for first injection appt with nurse.     Tasks added this encounter   6/17/2022 - Refill Call (Auto Added)  2/26/2023 - Clinical - Follow Up Assesement (Annual)   Tasks due within next 3 months   No tasks due.     Isidro Vinson, PharmD  [facility]  [address] [facility] [address]  Phone: [phone]  Fax: [phone]  "

## 2022-06-30 PROBLEM — Z78.9 HISTORY OF URINARY SELF-CATHETERIZATION: Status: ACTIVE | Noted: 2022-06-30

## 2022-06-30 PROBLEM — M33.20 POLYMYOSITIS: Status: ACTIVE | Noted: 2022-06-30

## 2022-06-30 PROBLEM — L30.9 DERMATITIS: Status: ACTIVE | Noted: 2022-06-30

## 2022-06-30 PROBLEM — E66.09 CLASS 1 OBESITY DUE TO EXCESS CALORIES WITHOUT SERIOUS COMORBIDITY WITH BODY MASS INDEX (BMI) OF 32.0 TO 32.9 IN ADULT: Status: ACTIVE | Noted: 2022-06-30

## 2022-06-30 PROBLEM — D72.19 OTHER EOSINOPHILIA: Status: ACTIVE | Noted: 2022-06-30

## 2022-06-30 PROBLEM — E66.811 CLASS 1 OBESITY DUE TO EXCESS CALORIES WITHOUT SERIOUS COMORBIDITY WITH BODY MASS INDEX (BMI) OF 32.0 TO 32.9 IN ADULT: Status: ACTIVE | Noted: 2022-06-30

## 2022-06-30 PROBLEM — M35.89: Status: ACTIVE | Noted: 2022-06-30

## 2022-06-30 PROBLEM — J84.10 PULMONARY FIBROSIS: Status: ACTIVE | Noted: 2022-06-30

## 2022-06-30 PROBLEM — R19.7 DIARRHEA OF PRESUMED INFECTIOUS ORIGIN: Status: ACTIVE | Noted: 2022-06-30

## 2022-07-05 ENCOUNTER — LAB VISIT (OUTPATIENT)
Dept: LAB | Facility: HOSPITAL | Age: 62
End: 2022-07-05
Attending: INTERNAL MEDICINE
Payer: MEDICAID

## 2022-07-05 DIAGNOSIS — R19.7 DIARRHEA OF PRESUMED INFECTIOUS ORIGIN: ICD-10-CM

## 2022-07-05 PROCEDURE — 87046 STOOL CULTR AEROBIC BACT EA: CPT | Performed by: INTERNAL MEDICINE

## 2022-07-05 PROCEDURE — 87427 SHIGA-LIKE TOXIN AG IA: CPT | Mod: 59 | Performed by: INTERNAL MEDICINE

## 2022-07-05 PROCEDURE — 87209 SMEAR COMPLEX STAIN: CPT | Performed by: INTERNAL MEDICINE

## 2022-07-05 PROCEDURE — 87045 FECES CULTURE AEROBIC BACT: CPT | Performed by: INTERNAL MEDICINE

## 2022-07-06 NOTE — ED NOTES
Blood bank contacted ED and states blood was released, but will be multiple hours to type and match pt due to significant antibodies in blood. Charge nurse and ENEIDA Galvan, aware.   Winlevi Pregnancy And Lactation Text: This medication is considered safe during pregnancy and breastfeeding.

## 2022-07-07 LAB
E COLI SXT1 STL QL IA: NEGATIVE
E COLI SXT2 STL QL IA: NEGATIVE

## 2022-07-08 ENCOUNTER — LAB VISIT (OUTPATIENT)
Dept: LAB | Facility: HOSPITAL | Age: 62
End: 2022-07-08
Attending: INTERNAL MEDICINE
Payer: MEDICAID

## 2022-07-08 DIAGNOSIS — D72.19 OTHER EOSINOPHILIA: ICD-10-CM

## 2022-07-08 LAB
BASOPHILS # BLD AUTO: 0.06 K/UL (ref 0–0.2)
BASOPHILS NFR BLD: 0.9 % (ref 0–1.9)
DIFFERENTIAL METHOD: ABNORMAL
EOSINOPHIL # BLD AUTO: 0.3 K/UL (ref 0–0.5)
EOSINOPHIL NFR BLD: 4.6 % (ref 0–8)
ERYTHROCYTE [DISTWIDTH] IN BLOOD BY AUTOMATED COUNT: 15.2 % (ref 11.5–14.5)
HCT VFR BLD AUTO: 41.2 % (ref 37–48.5)
HGB BLD-MCNC: 13.7 G/DL (ref 12–16)
IMM GRANULOCYTES # BLD AUTO: 0.02 K/UL (ref 0–0.04)
IMM GRANULOCYTES NFR BLD AUTO: 0.3 % (ref 0–0.5)
LYMPHOCYTES # BLD AUTO: 2.6 K/UL (ref 1–4.8)
LYMPHOCYTES NFR BLD: 37.2 % (ref 18–48)
MCH RBC QN AUTO: 29.8 PG (ref 27–31)
MCHC RBC AUTO-ENTMCNC: 33.3 G/DL (ref 32–36)
MCV RBC AUTO: 90 FL (ref 82–98)
MONOCYTES # BLD AUTO: 1.7 K/UL (ref 0.3–1)
MONOCYTES NFR BLD: 24.8 % (ref 4–15)
NEUTROPHILS # BLD AUTO: 2.3 K/UL (ref 1.8–7.7)
NEUTROPHILS NFR BLD: 32.2 % (ref 38–73)
NRBC BLD-RTO: 0 /100 WBC
PLATELET # BLD AUTO: 409 K/UL (ref 150–450)
PMV BLD AUTO: 10 FL (ref 9.2–12.9)
RBC # BLD AUTO: 4.59 M/UL (ref 4–5.4)
WBC # BLD AUTO: 7.01 K/UL (ref 3.9–12.7)

## 2022-07-08 PROCEDURE — 85025 COMPLETE CBC W/AUTO DIFF WBC: CPT | Performed by: INTERNAL MEDICINE

## 2022-07-08 PROCEDURE — 36415 COLL VENOUS BLD VENIPUNCTURE: CPT | Performed by: INTERNAL MEDICINE

## 2022-07-09 LAB — BACTERIA STL CULT: NORMAL

## 2022-07-12 LAB — O+P STL MICRO: NORMAL

## 2022-07-20 ENCOUNTER — SPECIALTY PHARMACY (OUTPATIENT)
Dept: PHARMACY | Facility: CLINIC | Age: 62
End: 2022-07-20

## 2022-07-20 NOTE — TELEPHONE ENCOUNTER
Outgoing call: Spoke with patient on Fasenra refill, her doctor got her holding off on taking it due to a reaction. She said her whole body from head to toe has broken out with hives. Her doctor have multiple test get ran to find out what's going on. She will follow up with [facility] once her doctor tell her to start back taking it.

## 2022-07-21 ENCOUNTER — LAB VISIT (OUTPATIENT)
Dept: LAB | Facility: HOSPITAL | Age: 62
End: 2022-07-21
Attending: INTERNAL MEDICINE
Payer: MEDICAID

## 2022-07-21 DIAGNOSIS — M25.50 POLYARTHRALGIA: ICD-10-CM

## 2022-07-21 DIAGNOSIS — R76.8 POSITIVE ANA (ANTINUCLEAR ANTIBODY): ICD-10-CM

## 2022-07-21 DIAGNOSIS — M15.9 PRIMARY OSTEOARTHRITIS INVOLVING MULTIPLE JOINTS: ICD-10-CM

## 2022-07-21 LAB — LDH SERPL L TO P-CCNC: 222 U/L (ref 110–260)

## 2022-07-21 PROCEDURE — 36415 COLL VENOUS BLD VENIPUNCTURE: CPT | Performed by: INTERNAL MEDICINE

## 2022-07-21 PROCEDURE — 83615 LACTATE (LD) (LDH) ENZYME: CPT | Performed by: INTERNAL MEDICINE

## 2022-07-25 NOTE — TELEPHONE ENCOUNTER
Outgoing call regarding Fasenra refill. Pt stated she has hives and hasnt started the Fasenra yet. Pt tried reaching out [facility] but MD is not available till 7/26/22. Pt stated she is also on Diflucan . Transferred to [facility].

## 2022-07-25 NOTE — TELEPHONE ENCOUNTER
Specialty Pharmacy - Adherence Intervention    Patient reported/expressed:  Knowledge deficits: Product inconvenience    Impact on patient care:  Elimination of therapy inappropriateness    Additional Notes  During a routine refill call patient states that she has not started Fasnera yet due to a rash. And itching. Patient states that she has seen dermatology for this and was prescribed Diflucan and ketoconazole cream (?). Patient state that she has discontinued the cream as it was not helping. Patient has tried OTC antihistamines that have not been effective. Advised patient to seek urgent medical attention. Patient states that she has some prednisone that she may take with her provider's direction. Advised patient to let [facility] know when she takes her first Fasenra dose.

## 2022-07-26 NOTE — TELEPHONE ENCOUNTER
Patient has not started Fasenra. Refill activity deleted. Will f/u through intervention. Patient was advised at intervention to let [facility] know when she completes first injection.

## 2022-09-02 ENCOUNTER — HOSPITAL ENCOUNTER (OUTPATIENT)
Dept: RADIOLOGY | Facility: HOSPITAL | Age: 62
Discharge: HOME | End: 2022-09-02
Attending: FAMILY MEDICINE
Payer: MEDICAID

## 2022-09-02 VITALS — BODY MASS INDEX: 32.65 KG/M2 | WEIGHT: 208 LBS | HEIGHT: 67 IN

## 2022-09-02 DIAGNOSIS — Z12.31 ENCOUNTER FOR SCREENING MAMMOGRAM FOR BREAST CANCER: ICD-10-CM

## 2022-09-02 PROCEDURE — 77067 SCR MAMMO BI INCL CAD: CPT | Mod: TC

## 2022-11-09 ENCOUNTER — LAB VISIT (OUTPATIENT)
Dept: LAB | Facility: HOSPITAL | Age: 62
End: 2022-11-09
Attending: INTERNAL MEDICINE
Payer: MEDICAID

## 2022-11-09 DIAGNOSIS — R76.8 POSITIVE ANA (ANTINUCLEAR ANTIBODY): ICD-10-CM

## 2022-11-09 LAB
ALBUMIN SERPL BCP-MCNC: 3.8 G/DL (ref 3.5–5.2)
ALP SERPL-CCNC: 87 U/L (ref 55–135)
ALT SERPL W/O P-5'-P-CCNC: 65 U/L (ref 10–44)
ANION GAP SERPL CALC-SCNC: 8 MMOL/L (ref 8–16)
AST SERPL-CCNC: 42 U/L (ref 10–40)
BASOPHILS # BLD AUTO: 0.13 K/UL (ref 0–0.2)
BASOPHILS NFR BLD: 1.4 % (ref 0–1.9)
BILIRUB SERPL-MCNC: 0.8 MG/DL (ref 0.1–1)
BUN SERPL-MCNC: 11 MG/DL (ref 8–23)
CALCIUM SERPL-MCNC: 9.2 MG/DL (ref 8.7–10.5)
CHLORIDE SERPL-SCNC: 104 MMOL/L (ref 95–110)
CO2 SERPL-SCNC: 29 MMOL/L (ref 23–29)
CREAT SERPL-MCNC: 0.8 MG/DL (ref 0.5–1.4)
DIFFERENTIAL METHOD: ABNORMAL
EOSINOPHIL # BLD AUTO: 0.2 K/UL (ref 0–0.5)
EOSINOPHIL NFR BLD: 1.7 % (ref 0–8)
ERYTHROCYTE [DISTWIDTH] IN BLOOD BY AUTOMATED COUNT: 17.8 % (ref 11.5–14.5)
EST. GFR  (NO RACE VARIABLE): >60 ML/MIN/1.73 M^2
GLUCOSE SERPL-MCNC: 122 MG/DL (ref 70–110)
HBV CORE AB SERPL QL IA: NORMAL
HBV SURFACE AB SER-ACNC: 12.54 MIU/ML
HBV SURFACE AB SER-ACNC: REACTIVE M[IU]/ML
HBV SURFACE AG SERPL QL IA: NORMAL
HCT VFR BLD AUTO: 41.6 % (ref 37–48.5)
HCV AB SERPL QL IA: NORMAL
HGB BLD-MCNC: 13.7 G/DL (ref 12–16)
IMM GRANULOCYTES # BLD AUTO: 0.03 K/UL (ref 0–0.04)
IMM GRANULOCYTES NFR BLD AUTO: 0.3 % (ref 0–0.5)
LYMPHOCYTES # BLD AUTO: 2.5 K/UL (ref 1–4.8)
LYMPHOCYTES NFR BLD: 28 % (ref 18–48)
MCH RBC QN AUTO: 28.3 PG (ref 27–31)
MCHC RBC AUTO-ENTMCNC: 32.9 G/DL (ref 32–36)
MCV RBC AUTO: 86 FL (ref 82–98)
MONOCYTES # BLD AUTO: 1.5 K/UL (ref 0.3–1)
MONOCYTES NFR BLD: 16.6 % (ref 4–15)
NEUTROPHILS # BLD AUTO: 4.7 K/UL (ref 1.8–7.7)
NEUTROPHILS NFR BLD: 52 % (ref 38–73)
NRBC BLD-RTO: 0 /100 WBC
PLATELET # BLD AUTO: 451 K/UL (ref 150–450)
PMV BLD AUTO: 10 FL (ref 9.2–12.9)
POTASSIUM SERPL-SCNC: 4.3 MMOL/L (ref 3.5–5.1)
PROT SERPL-MCNC: 8.3 G/DL (ref 6–8.4)
RBC # BLD AUTO: 4.84 M/UL (ref 4–5.4)
SODIUM SERPL-SCNC: 141 MMOL/L (ref 136–145)
WBC # BLD AUTO: 9 K/UL (ref 3.9–12.7)

## 2022-11-09 PROCEDURE — 86480 TB TEST CELL IMMUN MEASURE: CPT | Performed by: INTERNAL MEDICINE

## 2022-11-09 PROCEDURE — 86706 HEP B SURFACE ANTIBODY: CPT | Mod: 91 | Performed by: INTERNAL MEDICINE

## 2022-11-09 PROCEDURE — 86803 HEPATITIS C AB TEST: CPT | Performed by: INTERNAL MEDICINE

## 2022-11-09 PROCEDURE — 86704 HEP B CORE ANTIBODY TOTAL: CPT | Performed by: INTERNAL MEDICINE

## 2022-11-09 PROCEDURE — 85025 COMPLETE CBC W/AUTO DIFF WBC: CPT | Performed by: INTERNAL MEDICINE

## 2022-11-09 PROCEDURE — 87340 HEPATITIS B SURFACE AG IA: CPT | Performed by: INTERNAL MEDICINE

## 2022-11-09 PROCEDURE — 36415 COLL VENOUS BLD VENIPUNCTURE: CPT | Performed by: INTERNAL MEDICINE

## 2022-11-09 PROCEDURE — 80053 COMPREHEN METABOLIC PANEL: CPT | Performed by: INTERNAL MEDICINE

## 2022-11-10 LAB
GAMMA INTERFERON BACKGROUND BLD IA-ACNC: 0.01 IU/ML
M TB IFN-G CD4+ BCKGRND COR BLD-ACNC: 0 IU/ML
MITOGEN IGNF BCKGRD COR BLD-ACNC: 9.99 IU/ML
TB GOLD PLUS: NEGATIVE
TB2 - NIL: -0.01 IU/ML

## 2022-11-29 ENCOUNTER — HOSPITAL ENCOUNTER (OUTPATIENT)
Dept: RADIOLOGY | Facility: HOSPITAL | Age: 62
Discharge: HOME | End: 2022-11-29
Attending: INTERNAL MEDICINE
Payer: MEDICAID

## 2022-11-29 DIAGNOSIS — J84.9 INTERSTITIAL PULMONARY DISEASE, UNSPECIFIED: ICD-10-CM

## 2022-11-29 PROCEDURE — 71250 CT THORAX DX C-: CPT | Mod: TC

## 2022-12-09 ENCOUNTER — PATIENT MESSAGE (OUTPATIENT)
Dept: PHARMACY | Facility: CLINIC | Age: 62
End: 2022-12-09
Payer: MEDICAID

## 2023-01-13 ENCOUNTER — LAB VISIT (OUTPATIENT)
Dept: LAB | Facility: HOSPITAL | Age: 63
End: 2023-01-13
Attending: FAMILY MEDICINE
Payer: MEDICAID

## 2023-01-13 DIAGNOSIS — I10 PRIMARY HYPERTENSION: ICD-10-CM

## 2023-01-13 DIAGNOSIS — E78.2 MIXED HYPERLIPIDEMIA: ICD-10-CM

## 2023-01-13 LAB
ALBUMIN SERPL BCP-MCNC: 3.7 G/DL (ref 3.5–5.2)
ALP SERPL-CCNC: 72 U/L (ref 55–135)
ALT SERPL W/O P-5'-P-CCNC: 24 U/L (ref 10–44)
ANION GAP SERPL CALC-SCNC: 3 MMOL/L (ref 8–16)
AST SERPL-CCNC: 23 U/L (ref 10–40)
BASOPHILS # BLD AUTO: 0.12 K/UL (ref 0–0.2)
BASOPHILS NFR BLD: 1 % (ref 0–1.9)
BILIRUB SERPL-MCNC: 0.4 MG/DL (ref 0.1–1)
BUN SERPL-MCNC: 12 MG/DL (ref 8–23)
CALCIUM SERPL-MCNC: 9.2 MG/DL (ref 8.7–10.5)
CHLORIDE SERPL-SCNC: 108 MMOL/L (ref 95–110)
CHOLEST SERPL-MCNC: 198 MG/DL (ref 120–199)
CHOLEST/HDLC SERPL: 4.4 {RATIO} (ref 2–5)
CO2 SERPL-SCNC: 28 MMOL/L (ref 23–29)
CREAT SERPL-MCNC: 0.9 MG/DL (ref 0.5–1.4)
DIFFERENTIAL METHOD: ABNORMAL
EOSINOPHIL # BLD AUTO: 0.3 K/UL (ref 0–0.5)
EOSINOPHIL NFR BLD: 2.2 % (ref 0–8)
ERYTHROCYTE [DISTWIDTH] IN BLOOD BY AUTOMATED COUNT: 17.2 % (ref 11.5–14.5)
EST. GFR  (NO RACE VARIABLE): >60 ML/MIN/1.73 M^2
GLUCOSE SERPL-MCNC: 104 MG/DL (ref 70–110)
HCT VFR BLD AUTO: 43.7 % (ref 37–48.5)
HDLC SERPL-MCNC: 45 MG/DL (ref 40–75)
HDLC SERPL: 22.7 % (ref 20–50)
HGB BLD-MCNC: 14.2 G/DL (ref 12–16)
IMM GRANULOCYTES # BLD AUTO: 0.04 K/UL (ref 0–0.04)
IMM GRANULOCYTES NFR BLD AUTO: 0.3 % (ref 0–0.5)
LDLC SERPL CALC-MCNC: 127.6 MG/DL (ref 63–159)
LYMPHOCYTES # BLD AUTO: 2.8 K/UL (ref 1–4.8)
LYMPHOCYTES NFR BLD: 23.6 % (ref 18–48)
MCH RBC QN AUTO: 27.8 PG (ref 27–31)
MCHC RBC AUTO-ENTMCNC: 32.5 G/DL (ref 32–36)
MCV RBC AUTO: 86 FL (ref 82–98)
MONOCYTES # BLD AUTO: 1.4 K/UL (ref 0.3–1)
MONOCYTES NFR BLD: 11.7 % (ref 4–15)
NEUTROPHILS # BLD AUTO: 7.3 K/UL (ref 1.8–7.7)
NEUTROPHILS NFR BLD: 61.2 % (ref 38–73)
NONHDLC SERPL-MCNC: 153 MG/DL
NRBC BLD-RTO: 0 /100 WBC
PLATELET # BLD AUTO: 507 K/UL (ref 150–450)
PMV BLD AUTO: 9.8 FL (ref 9.2–12.9)
POTASSIUM SERPL-SCNC: 4.8 MMOL/L (ref 3.5–5.1)
PROT SERPL-MCNC: 8.3 G/DL (ref 6–8.4)
RBC # BLD AUTO: 5.11 M/UL (ref 4–5.4)
SODIUM SERPL-SCNC: 139 MMOL/L (ref 136–145)
TRIGL SERPL-MCNC: 127 MG/DL (ref 30–150)
WBC # BLD AUTO: 11.91 K/UL (ref 3.9–12.7)

## 2023-01-13 PROCEDURE — 36415 COLL VENOUS BLD VENIPUNCTURE: CPT | Performed by: FAMILY MEDICINE

## 2023-01-13 PROCEDURE — 85025 COMPLETE CBC W/AUTO DIFF WBC: CPT | Performed by: FAMILY MEDICINE

## 2023-01-13 PROCEDURE — 80053 COMPREHEN METABOLIC PANEL: CPT | Performed by: FAMILY MEDICINE

## 2023-01-13 PROCEDURE — 80061 LIPID PANEL: CPT | Performed by: FAMILY MEDICINE

## 2023-01-23 ENCOUNTER — LAB VISIT (OUTPATIENT)
Dept: LAB | Facility: HOSPITAL | Age: 63
End: 2023-01-23
Attending: FAMILY MEDICINE
Payer: MEDICAID

## 2023-01-23 DIAGNOSIS — R31.9 HEMATURIA, UNSPECIFIED TYPE: ICD-10-CM

## 2023-01-23 DIAGNOSIS — Z78.9 HISTORY OF URINARY SELF-CATHETERIZATION: ICD-10-CM

## 2023-01-23 LAB
BACTERIA #/AREA URNS HPF: ABNORMAL /HPF
BILIRUB UR QL STRIP: NEGATIVE
CLARITY UR: CLEAR
COLOR UR: YELLOW
GLUCOSE UR QL STRIP: NEGATIVE
HGB UR QL STRIP: NEGATIVE
HYALINE CASTS #/AREA URNS LPF: 7.8 /LPF
KETONES UR QL STRIP: NEGATIVE
LEUKOCYTE ESTERASE UR QL STRIP: ABNORMAL
MICROSCOPIC COMMENT: ABNORMAL
NITRITE UR QL STRIP: POSITIVE
PH UR STRIP: 7 [PH] (ref 5–8)
PROT UR QL STRIP: NEGATIVE
RBC #/AREA URNS HPF: 3 /HPF (ref 0–4)
SP GR UR STRIP: 1.01 (ref 1–1.03)
SQUAMOUS #/AREA URNS HPF: 4 /HPF
URN SPEC COLLECT METH UR: ABNORMAL
UROBILINOGEN UR STRIP-ACNC: NEGATIVE EU/DL
WBC #/AREA URNS HPF: 16 /HPF (ref 0–5)

## 2023-01-23 PROCEDURE — 87088 URINE BACTERIA CULTURE: CPT | Performed by: FAMILY MEDICINE

## 2023-01-23 PROCEDURE — 87077 CULTURE AEROBIC IDENTIFY: CPT | Performed by: FAMILY MEDICINE

## 2023-01-23 PROCEDURE — 87086 URINE CULTURE/COLONY COUNT: CPT | Performed by: FAMILY MEDICINE

## 2023-01-23 PROCEDURE — 81000 URINALYSIS NONAUTO W/SCOPE: CPT | Performed by: FAMILY MEDICINE

## 2023-01-23 PROCEDURE — 87186 SC STD MICRODIL/AGAR DIL: CPT | Performed by: FAMILY MEDICINE

## 2023-01-26 LAB — BACTERIA UR CULT: ABNORMAL

## 2023-03-02 ENCOUNTER — LAB VISIT (OUTPATIENT)
Dept: LAB | Facility: HOSPITAL | Age: 63
End: 2023-03-02
Attending: INTERNAL MEDICINE
Payer: MEDICAID

## 2023-03-02 DIAGNOSIS — J84.9 ILD (INTERSTITIAL LUNG DISEASE): ICD-10-CM

## 2023-03-02 LAB
ALBUMIN SERPL BCP-MCNC: 3.8 G/DL (ref 3.5–5.2)
ALP SERPL-CCNC: 69 U/L (ref 55–135)
ALT SERPL W/O P-5'-P-CCNC: 27 U/L (ref 10–44)
ANION GAP SERPL CALC-SCNC: 2 MMOL/L (ref 8–16)
AST SERPL-CCNC: 23 U/L (ref 10–40)
BASOPHILS # BLD AUTO: 0.11 K/UL (ref 0–0.2)
BASOPHILS NFR BLD: 1.6 % (ref 0–1.9)
BILIRUB SERPL-MCNC: 0.4 MG/DL (ref 0.1–1)
BUN SERPL-MCNC: 15 MG/DL (ref 8–23)
CALCIUM SERPL-MCNC: 9.1 MG/DL (ref 8.7–10.5)
CHLORIDE SERPL-SCNC: 112 MMOL/L (ref 95–110)
CO2 SERPL-SCNC: 26 MMOL/L (ref 23–29)
CREAT SERPL-MCNC: 0.8 MG/DL (ref 0.5–1.4)
CRP SERPL-MCNC: 0.3 MG/DL (ref 0–0.75)
DIFFERENTIAL METHOD: ABNORMAL
EOSINOPHIL # BLD AUTO: 0.3 K/UL (ref 0–0.5)
EOSINOPHIL NFR BLD: 4.6 % (ref 0–8)
ERYTHROCYTE [DISTWIDTH] IN BLOOD BY AUTOMATED COUNT: 17.6 % (ref 11.5–14.5)
ERYTHROCYTE [SEDIMENTATION RATE] IN BLOOD: 8 MM/HR (ref 0–20)
EST. GFR  (NO RACE VARIABLE): >60 ML/MIN/1.73 M^2
GLUCOSE SERPL-MCNC: 84 MG/DL (ref 70–110)
HCT VFR BLD AUTO: 41.7 % (ref 37–48.5)
HGB BLD-MCNC: 14 G/DL (ref 12–16)
IMM GRANULOCYTES # BLD AUTO: 0.01 K/UL (ref 0–0.04)
IMM GRANULOCYTES NFR BLD AUTO: 0.1 % (ref 0–0.5)
LYMPHOCYTES # BLD AUTO: 2.8 K/UL (ref 1–4.8)
LYMPHOCYTES NFR BLD: 41 % (ref 18–48)
MCH RBC QN AUTO: 28.9 PG (ref 27–31)
MCHC RBC AUTO-ENTMCNC: 33.6 G/DL (ref 32–36)
MCV RBC AUTO: 86 FL (ref 82–98)
MONOCYTES # BLD AUTO: 0.9 K/UL (ref 0.3–1)
MONOCYTES NFR BLD: 12.9 % (ref 4–15)
NEUTROPHILS # BLD AUTO: 2.8 K/UL (ref 1.8–7.7)
NEUTROPHILS NFR BLD: 39.8 % (ref 38–73)
NRBC BLD-RTO: 0 /100 WBC
PLATELET # BLD AUTO: 411 K/UL (ref 150–450)
PMV BLD AUTO: 10.4 FL (ref 9.2–12.9)
POTASSIUM SERPL-SCNC: 4.2 MMOL/L (ref 3.5–5.1)
PROT SERPL-MCNC: 8.4 G/DL (ref 6–8.4)
RBC # BLD AUTO: 4.84 M/UL (ref 4–5.4)
SODIUM SERPL-SCNC: 140 MMOL/L (ref 136–145)
WBC # BLD AUTO: 6.92 K/UL (ref 3.9–12.7)

## 2023-03-02 PROCEDURE — 36415 COLL VENOUS BLD VENIPUNCTURE: CPT | Performed by: INTERNAL MEDICINE

## 2023-03-02 PROCEDURE — 86140 C-REACTIVE PROTEIN: CPT | Performed by: INTERNAL MEDICINE

## 2023-03-02 PROCEDURE — 85025 COMPLETE CBC W/AUTO DIFF WBC: CPT | Performed by: INTERNAL MEDICINE

## 2023-03-02 PROCEDURE — 85651 RBC SED RATE NONAUTOMATED: CPT | Performed by: INTERNAL MEDICINE

## 2023-03-02 PROCEDURE — 80053 COMPREHEN METABOLIC PANEL: CPT | Performed by: INTERNAL MEDICINE

## 2023-04-06 ENCOUNTER — HOSPITAL ENCOUNTER (EMERGENCY)
Facility: HOSPITAL | Age: 63
Discharge: HOME | End: 2023-04-06
Attending: EMERGENCY MEDICINE
Payer: MEDICAID

## 2023-04-06 VITALS
OXYGEN SATURATION: 96 % | RESPIRATION RATE: 18 BRPM | DIASTOLIC BLOOD PRESSURE: 68 MMHG | WEIGHT: 199.81 LBS | HEART RATE: 73 BPM | BODY MASS INDEX: 31.36 KG/M2 | TEMPERATURE: 98 F | SYSTOLIC BLOOD PRESSURE: 142 MMHG | HEIGHT: 67 IN

## 2023-04-06 DIAGNOSIS — S69.92XA WRIST INJURY, LEFT, INITIAL ENCOUNTER: ICD-10-CM

## 2023-04-06 PROCEDURE — 99283 EMERGENCY DEPT VISIT LOW MDM: CPT

## 2023-04-06 PROCEDURE — 29125 APPL SHORT ARM SPLINT STATIC: CPT | Mod: LT

## 2023-04-06 RX ORDER — ACETAMINOPHEN AND CODEINE PHOSPHATE 300; 30 MG/1; MG/1
1 TABLET ORAL EVERY 6 HOURS PRN
Qty: 5 TABLET | Refills: 0 | Status: SHIPPED | OUTPATIENT
Start: 2023-04-06 | End: 2023-04-16

## 2023-04-06 NOTE — ED PROVIDER NOTES
Encounter Date: 2023       History     Chief Complaint   Patient presents with    Wrist Pain     Pt reports while opening a bottle of tea this morning she hurt her left wrist. Pt reports it feels like its broken.      62-year-old female presents to the emergency room with left wrist and hand pain after opening a bottle of T this morning.  Patient is concerned she may have a fracture    Review of patient's allergies indicates:   Allergen Reactions    Ciprofloxacin Hives    Nsaids (non-steroidal anti-inflammatory drug) Other (See Comments)     Sensitive stomach, has had GI bleed.  AVM's.  Only short term use okay.    Phenobarbital Hives    Statins-hmg-coa reductase inhibitors     Sulfa (sulfonamide antibiotics) Hives    Tramadol      Past Medical History:   Diagnosis Date    Asthma     Autoimmune disease     Autoimmune hepatitis     Bipolar 1 disorder     Depression     DVT (deep vein thrombosis) in pregnancy     GERD (gastroesophageal reflux disease)     History of GI bleed     Insomnia     Neuromuscular disorder     PE (pulmonary thromboembolism)     Scleromyxedema     Thyroid disease      Past Surgical History:   Procedure Laterality Date    ABDOMINAL SURGERY      APPENDECTOMY      BACK SURGERY      BLADDER SUSPENSION      CARPAL TUNNEL RELEASE Bilateral     CERVICAL FUSION       SECTION      CHOLECYSTECTOMY      COLONOSCOPY N/A 2019    Procedure: COLONOSCOPY;  Surgeon: Jose M Luis MD;  Location: [facility];  Service: Endoscopy;  Laterality: N/A;    CORRECTION OF HAMMER TOE Left 2021    Procedure: CORRECTION, HAMMER TOE;  Surgeon: Hansel Ybarra DPM;  Location: [facility] OR;  Service: Podiatry;  Laterality: Left;    ESOPHAGOGASTRODUODENOSCOPY N/A 2019    Procedure: EGD (ESOPHAGOGASTRODUODENOSCOPY);  Surgeon: Jose M Luis MD;  Location: [facility];  Service: Endoscopy;  Laterality: N/A;    FOOT ARTHRODESIS Left 2021    Procedure: FUSION, FOOT;  Surgeon: Hansel ZALDIVAR  GEOFFREY Ybarra;  Location: [facility] OR;  Service: Podiatry;  Laterality: Left;  Anesthesia  Stephen confirmed 3/25/21 [place]    GASTRIC BYPASS      GABRIEL FILTER PLACEMENT      HERNIA REPAIR      HYSTERECTOMY      INCONTINENCE SURGERY  02/10/2020    LUMBAR FUSION      NECK SURGERY      OOPHORECTOMY      spleenectomy      MELVIN BUNIONECTOMY Left 2021    Procedure: EXCISION, BUNIONETTE;  Surgeon: Hansel Ybarra DPM;  Location: [facility] OR;  Service: Podiatry;  Laterality: Left;    THYROID CYST EXCISION  1982    TONSILLECTOMY       Family History   Problem Relation Age of Onset    Colon cancer Mother 70        Colon Cancer    Breast cancer Mother     Breast cancer Father     Colon cancer Father 55        Colon Cancer    Breast cancer Sister     No Known Problems Daughter     No Known Problems Maternal Aunt     No Known Problems Maternal Uncle     No Known Problems Paternal Aunt     No Known Problems Paternal Uncle     Breast cancer Maternal Grandmother     No Known Problems Maternal Grandfather     Breast cancer Paternal Grandmother     No Known Problems Paternal Grandfather     No Known Problems Other     Ovarian cancer Neg Hx     BRCA 1/2 Neg Hx      Social History     Tobacco Use    Smoking status: Every Day     Packs/day: 0.50     Years: 40.00     Pack years: 20.00     Types: Cigarettes     Last attempt to quit: 3/19/2019     Years since quittin.0    Smokeless tobacco: Never    Tobacco comments:     6 a day    Substance Use Topics    Alcohol use: Yes     Comment: Socially    Drug use: No     Review of Systems   Constitutional:  Negative for fever.   HENT:  Negative for sore throat.    Respiratory:  Negative for shortness of breath.    Cardiovascular:  Negative for chest pain.   Gastrointestinal:  Negative for nausea.   Genitourinary:  Negative for dysuria.   Musculoskeletal:  Positive for arthralgias. Negative for back pain.   Skin:  Negative for rash.   Neurological:  Negative for weakness.   Hematological:   Does not bruise/bleed easily.   All other systems reviewed and are negative.    Physical Exam     Initial Vitals [04/06/23 1701]   BP Pulse Resp Temp SpO2   (!) 142/68 73 18 98.3 °F (36.8 °C) 96 %      MAP       --         Physical Exam    Nursing note and vitals reviewed.  Constitutional: She appears well-developed and well-nourished.   HENT:   Head: Normocephalic and atraumatic.   Eyes: Pupils are equal, round, and reactive to light.   Neck:   Normal range of motion.  Musculoskeletal:         General: Tenderness present.      Cervical back: Normal range of motion.      Comments: Decreased range of motion due to pain.     Neurological: She is alert and oriented to person, place, and time.   Skin: Skin is warm and dry.   Psychiatric: She has a normal mood and affect.       ED Course   Procedures  Labs Reviewed - No data to display       Imaging Results              X-Ray Wrist Complete Left (Final result)  Result time 04/06/23 17:19:48      Final result by Krystal Mireles MD (04/06/23 17:19:48)                   Impression:      Degenerative changes and ulnar minus variance as above with no acute fracture      Electronically signed by: Krystal Mireles MD  Date:    04/06/2023  Time:    17:19               Narrative:    EXAMINATION:  XR WRIST COMPLETE 3 VIEWS LEFT    CLINICAL HISTORY:  Unspecified injury of left wrist, hand and finger(s), initial encounter    COMPARISON:  March 2, 2019    FINDINGS:  Significant degenerative changes of the 1st carpometacarpal joint.  The wrist is intact with no acute fracture.  Incidental ulnar minus variance..                                       Medications - No data to display  Medical Decision Making:   Differential Diagnosis:   Wrist sprain, wrist fracture  Clinical Tests:   Radiological Study: Ordered and Reviewed                        Clinical Impression:   Final diagnoses:  [S69.92XA] Wrist injury, left, initial encounter        ED Disposition Condition    Discharge Stable           ED Prescriptions       Medication Sig Dispense Start Date End Date Auth. Provider    acetaminophen-codeine 300-30mg (TYLENOL #3) 300-30 mg Tab Take 1 tablet by mouth every 6 (six) hours as needed (pain). 5 tablet 4/6/2023 4/16/2023 Margaret Eller NP          Follow-up Information       Follow up With Specialties Details Why Contact Info    Dean Vidales MD Family Medicine  As needed [address] [facility] [address]  [phone]               Margaret Eller NP  04/06/23 1732

## 2023-07-11 PROBLEM — J84.9 INTERSTITIAL PULMONARY DISEASE, UNSPECIFIED: Status: ACTIVE | Noted: 2023-07-11

## 2023-07-13 ENCOUNTER — HOSPITAL ENCOUNTER (OUTPATIENT)
Dept: RADIOLOGY | Facility: HOSPITAL | Age: 63
Discharge: HOME | End: 2023-07-13
Attending: INTERNAL MEDICINE
Payer: MEDICAID

## 2023-07-13 DIAGNOSIS — J84.9 INTERSTITIAL PULMONARY DISEASE, UNSPECIFIED: ICD-10-CM

## 2023-07-13 PROCEDURE — 71250 CT THORAX DX C-: CPT | Mod: TC

## 2023-07-21 ENCOUNTER — PATIENT OUTREACH (OUTPATIENT)
Dept: ADMINISTRATIVE | Facility: HOSPITAL | Age: 63
End: 2023-07-21
Payer: MEDICAID

## 2023-07-21 DIAGNOSIS — F17.210 CIGARETTE SMOKER: ICD-10-CM

## 2023-07-21 DIAGNOSIS — Z12.31 ENCOUNTER FOR SCREENING MAMMOGRAM FOR BREAST CANCER: Primary | ICD-10-CM

## 2023-08-03 ENCOUNTER — CLINICAL SUPPORT (OUTPATIENT)
Dept: SMOKING CESSATION | Facility: CLINIC | Age: 63
End: 2023-08-03
Payer: COMMERCIAL

## 2023-08-03 DIAGNOSIS — F17.200 NICOTINE DEPENDENCE: Primary | ICD-10-CM

## 2023-08-03 PROCEDURE — 99404 PREV MED CNSL INDIV APPRX 60: CPT | Mod: S$GLB,,,

## 2023-08-03 PROCEDURE — 99404 PR PREVENT COUNSEL,INDIV,60 MIN: ICD-10-PCS | Mod: S$GLB,,,

## 2023-08-03 PROCEDURE — 99999 PR PBB SHADOW E&M-EST. PATIENT-LVL I: ICD-10-PCS | Mod: PBBFAC,,,

## 2023-08-03 PROCEDURE — 99999 PR PBB SHADOW E&M-EST. PATIENT-LVL I: CPT | Mod: PBBFAC,,,

## 2023-08-03 RX ORDER — IBUPROFEN 200 MG
1 TABLET ORAL DAILY
Qty: 28 PATCH | Refills: 0 | Status: SHIPPED | OUTPATIENT
Start: 2023-08-03 | End: 2023-09-07

## 2023-08-03 RX ORDER — DM/P-EPHED/ACETAMINOPH/DOXYLAM 30-7.5/3
2 LIQUID (ML) ORAL
Qty: 72 LOZENGE | Refills: 0 | Status: SHIPPED | OUTPATIENT
Start: 2023-08-03 | End: 2024-03-25

## 2023-08-10 ENCOUNTER — CLINICAL SUPPORT (OUTPATIENT)
Dept: SMOKING CESSATION | Facility: CLINIC | Age: 63
End: 2023-08-10
Payer: COMMERCIAL

## 2023-08-10 DIAGNOSIS — F17.200 NICOTINE DEPENDENCE: Primary | ICD-10-CM

## 2023-08-10 PROCEDURE — 99404 PREV MED CNSL INDIV APPRX 60: CPT | Mod: S$GLB,,,

## 2023-08-10 PROCEDURE — 99404 PR PREVENT COUNSEL,INDIV,60 MIN: ICD-10-PCS | Mod: S$GLB,,,

## 2023-08-10 PROCEDURE — 99999 PR PBB SHADOW E&M-EST. PATIENT-LVL I: CPT | Mod: PBBFAC,,,

## 2023-08-10 PROCEDURE — 99999 PR PBB SHADOW E&M-EST. PATIENT-LVL I: ICD-10-PCS | Mod: PBBFAC,,,

## 2023-08-10 NOTE — PROGRESS NOTES
Individual Follow-Up Form    8/10/2023    Quit Date: TBD    Clinical Status of Patient: Outpatient    Length of Service: 60 minutes    Continuing Medication: yes  Patches or Nicotine Lozenges    Other Medications: none     Target Symptoms: Withdrawal and medication side effects. The following were  rated moderate (3) to severe (4) on TCRS:  Moderate (3): nausea  Severe (4): none    Comments: Patient seen in clinic regarding smoking cessation progress update. Patient reports smoking 6-8 cpd .?New goal of <6?cpd set by pt. Congratulated Ms. Bae for progress made thus far. Pt?started on NRT 21 mg nicotine patch QD and 2 mg nicotine lozenge prn. No adverse reactions noted at this time. Pt started on?rate reduction and wait times prior to smoking.?Goal quit date is 11/18/23.?Identified patient personal reason for wanting to quit as health.?Reviewed learned addiction model, triggers, cues, and short/long term consequences of tobacco use. Pt identifies personal triggers as withdrawal upon waking,?following meals, and boredom. Pt's exhaled carbon monoxide level was?11?ppm as per Smokerlyzer. (non- smoker = 0-5 ppm.)? Pt to continue with counseling in?2?weeks.     Diagnosis: F17.200    Next Visit: 2 weeks

## 2023-08-24 ENCOUNTER — TELEPHONE (OUTPATIENT)
Dept: SMOKING CESSATION | Facility: CLINIC | Age: 63
End: 2023-08-24
Payer: MEDICAID

## 2023-08-24 NOTE — TELEPHONE ENCOUNTER
Received call from patient stating that she would like to reschedule smoking cessation follow up due to positive Covid test. Appointment rescheduled for 9/7/23 at 4 pm.

## 2023-09-07 ENCOUNTER — CLINICAL SUPPORT (OUTPATIENT)
Dept: SMOKING CESSATION | Facility: CLINIC | Age: 63
End: 2023-09-07
Payer: COMMERCIAL

## 2023-09-07 DIAGNOSIS — F17.200 NICOTINE DEPENDENCE: Primary | ICD-10-CM

## 2023-09-07 PROCEDURE — 99999 PR PBB SHADOW E&M-EST. PATIENT-LVL I: ICD-10-PCS | Mod: PBBFAC,,,

## 2023-09-07 PROCEDURE — 99403 PR PREVENT COUNSEL,INDIV,45 MIN: ICD-10-PCS | Mod: S$GLB,,,

## 2023-09-07 PROCEDURE — 99999 PR PBB SHADOW E&M-EST. PATIENT-LVL I: CPT | Mod: PBBFAC,,,

## 2023-09-07 PROCEDURE — 99403 PREV MED CNSL INDIV APPRX 45: CPT | Mod: S$GLB,,,

## 2023-09-07 RX ORDER — IBUPROFEN 200 MG
1 TABLET ORAL DAILY
Qty: 28 PATCH | Refills: 0 | Status: SHIPPED | OUTPATIENT
Start: 2023-09-07 | End: 2023-09-18 | Stop reason: DRUGHIGH

## 2023-09-07 NOTE — PROGRESS NOTES
Individual Follow-Up Form    9/7/2023    Quit Date: TBD    Clinical Status of Patient: Outpatient    Length of Service: 45 minutes    Continuing Medication: yes  Patches or Nicotine Lozenges    Other Medications: none     Target Symptoms: Withdrawal and medication side effects. The following were  rated moderate (3) to severe (4) on TCRS:  Moderate (3): none  Severe (4): none    Comments: Spoke with patient regarding smoking cessation progress update. Patient reports that she is down to 2-3 cpd. Congratulated pt for ongoing progress.?New goal is to complete a dry run. Pt?continues NRT 21 mg nicotine patch QD and 2 mg nicotine lozenge prn. No adverse reactions noted at this time.Dose adjustment made to 14 mg nicotine patch QD. Completion of TCRS (Tobacco Cessation Rating Scale) reviewed strategies, cues, and triggers. Introduced the negative impact of tobacco on health, the health advantages of discontinuing the use of tobacco, time line improved health changes after a quit, withdrawal issues to expect from nicotine and habit, and ways to achieve the goal of a quit.  Pt to continue with counseling in approximately?2?weeks.     Diagnosis: F17.200    Next Visit: 2 weeks

## 2023-09-18 ENCOUNTER — CLINICAL SUPPORT (OUTPATIENT)
Dept: SMOKING CESSATION | Facility: CLINIC | Age: 63
End: 2023-09-18
Payer: COMMERCIAL

## 2023-09-18 DIAGNOSIS — F17.200 NICOTINE DEPENDENCE: Primary | ICD-10-CM

## 2023-09-18 PROCEDURE — 99403 PREV MED CNSL INDIV APPRX 45: CPT | Mod: S$GLB,,,

## 2023-09-18 PROCEDURE — 99999 PR PBB SHADOW E&M-EST. PATIENT-LVL I: ICD-10-PCS | Mod: PBBFAC,,,

## 2023-09-18 PROCEDURE — 99403 PR PREVENT COUNSEL,INDIV,45 MIN: ICD-10-PCS | Mod: S$GLB,,,

## 2023-09-18 PROCEDURE — 99999 PR PBB SHADOW E&M-EST. PATIENT-LVL I: CPT | Mod: PBBFAC,,,

## 2023-09-18 RX ORDER — IBUPROFEN 200 MG
1 TABLET ORAL DAILY
Qty: 14 PATCH | Refills: 0 | Status: SHIPPED | OUTPATIENT
Start: 2023-09-18 | End: 2023-10-12 | Stop reason: SDUPTHER

## 2023-09-18 NOTE — PROGRESS NOTES
Individual Follow-Up Form    9/18/2023    Quit Date: TBD    Clinical Status of Patient: Outpatient    Length of Service: 45 minutes    Continuing Medication: yes  Patches or Nicotine Lozenges    Other Medications: none     Target Symptoms: Withdrawal and medication side effects. The following were  rated moderate (3) to severe (4) on TCRS:  Moderate (3): none  Severe (4): none    Comments: Spoke with patient regarding smoking cessation progress update. Patient reports that she has increased back up to 5 cpd. Goal is to complete a dry run. Encouraged pt to remain positive. NRT dose adjustment made to 21 mg nicotine patch QD. Completion of TCRS (Tobacco Cessation Rating Scale) reviewed strategies, controlling environment, cues, triggers, new goals set. Introduced high risk situations with preparation interventions, understanding urges, cravings, stress and relaxation. Open discussion with intervention discussion. Encouraged pt to stop relighting, try reading a page from her book when cravings strike, and to consider new quit date goal. Pt to continue with counseling in approximately?2?weeks.     Diagnosis: F17.200    Next Visit: 2 weeks

## 2023-10-02 ENCOUNTER — CLINICAL SUPPORT (OUTPATIENT)
Dept: SMOKING CESSATION | Facility: CLINIC | Age: 63
End: 2023-10-02
Payer: COMMERCIAL

## 2023-10-02 DIAGNOSIS — F17.200 NICOTINE DEPENDENCE: Primary | ICD-10-CM

## 2023-10-02 PROCEDURE — 99404 PR PREVENT COUNSEL,INDIV,60 MIN: ICD-10-PCS | Mod: S$GLB,,,

## 2023-10-02 PROCEDURE — 99999 PR PBB SHADOW E&M-EST. PATIENT-LVL I: CPT | Mod: PBBFAC,,,

## 2023-10-02 PROCEDURE — 99999 PR PBB SHADOW E&M-EST. PATIENT-LVL I: ICD-10-PCS | Mod: PBBFAC,,,

## 2023-10-02 PROCEDURE — 99404 PREV MED CNSL INDIV APPRX 60: CPT | Mod: S$GLB,,,

## 2023-10-02 NOTE — PROGRESS NOTES
Individual Follow-Up Form    10/2/2023    Quit Date: TBD    Clinical Status of Patient: Outpatient    Length of Service: 60 minutes    Continuing Medication: yes  Patches or Nicotine Lozenges    Other Medications: none     Target Symptoms: Withdrawal and medication side effects. The following were  rated moderate (3) to severe (4) on TCRS:  Moderate (3): none  Severe (4): none    Comments: Patient seen in clinic regarding smoking cessation progress update. Patient reports smoking 2-3 cpd, but continues to relight. Of note, Ms. Cash was able to complete a 52 hour dry run. Commended pt for accomplishments thus far. New goal is to complete another dry run, cease relighting, walk 5-10 mins/day, and to get rid of ashtrays. Pt?continues NRT 21 mg nicotine patch QD and 2 mg nicotine lozenge prn. No adverse reactions noted at this time. Advised pt to wean back down to 14 mg patch QD. Goal quit date remains 11/18/23.?Completion of TCRS (Tobacco Cessation Rating Scale) reviewed strategies, habitual behavior, stress, and high risk situations. Introduced stress with addition interventions, SOLVE, relaxation with interventions, nutrition, exercise, weight gain, and the importance of rewarding oneself for accomplishments toward becoming tobacco free. Open discussion of all items with interventions. Pt's exhaled carbon monoxide level was?12?ppm as per Smokerlyzer. (non- smoker = 0-5 ppm.)? Pt to continue with counseling in approximately?2?weeks.     Diagnosis: F17.200    Next Visit: 2 weeks

## 2023-10-12 ENCOUNTER — CLINICAL SUPPORT (OUTPATIENT)
Dept: SMOKING CESSATION | Facility: CLINIC | Age: 63
End: 2023-10-12
Payer: COMMERCIAL

## 2023-10-12 DIAGNOSIS — F17.200 NICOTINE DEPENDENCE: Primary | ICD-10-CM

## 2023-10-12 PROCEDURE — 99402 PREV MED CNSL INDIV APPRX 30: CPT | Mod: S$GLB,,,

## 2023-10-12 PROCEDURE — 99999 PR PBB SHADOW E&M-EST. PATIENT-LVL I: CPT | Mod: PBBFAC,,,

## 2023-10-12 PROCEDURE — 99999 PR PBB SHADOW E&M-EST. PATIENT-LVL I: ICD-10-PCS | Mod: PBBFAC,,,

## 2023-10-12 PROCEDURE — 99402 PR PREVENT COUNSEL,INDIV,30 MIN: ICD-10-PCS | Mod: S$GLB,,,

## 2023-10-12 RX ORDER — IBUPROFEN 200 MG
1 TABLET ORAL DAILY
Qty: 14 PATCH | Refills: 0 | Status: SHIPPED | OUTPATIENT
Start: 2023-10-12 | End: 2023-10-26 | Stop reason: SDUPTHER

## 2023-10-12 NOTE — PROGRESS NOTES
Individual Follow-Up Form    10/12/2023    Quit Date: TBD    Clinical Status of Patient: Outpatient    Length of Service: 30 minutes    Continuing Medication: yes  Patches or Nicotine Lozenges    Other Medications: none     Target Symptoms: Withdrawal and medication side effects. The following were  rated moderate (3) to severe (4) on TCRS:  Moderate (3): none  Severe (4): none    Comments: Spoke with patient regarding smoking cessation follow up. Pt continue to smoke 3 cpd, but did manage to cease relighting and threw away her ashtrays. Commended pt for accomplishments made thus far. Pt expresses to me that she suffered the loss of a close friend over the last week and was not able to attempt try run. Encouraged pt to try before out next meeting. Pt remains on 21 mg nicotine patch QD and 2 mg nicotine lozenges without any negative reactions to note at this time. Reviewed learned addiction model, triggers, cues, healthy coping skills, stress management, and short/long term consequences of tobacco use. Pt to continue with counseling in 2 weeks.    Diagnosis: F17.200    Next Visit: 2 weeks

## 2023-10-26 ENCOUNTER — CLINICAL SUPPORT (OUTPATIENT)
Dept: SMOKING CESSATION | Facility: CLINIC | Age: 63
End: 2023-10-26
Payer: COMMERCIAL

## 2023-10-26 DIAGNOSIS — F17.200 NICOTINE DEPENDENCE: Primary | ICD-10-CM

## 2023-10-26 PROCEDURE — 99404 PR PREVENT COUNSEL,INDIV,60 MIN: ICD-10-PCS | Mod: S$GLB,,,

## 2023-10-26 PROCEDURE — 99999 PR PBB SHADOW E&M-EST. PATIENT-LVL I: CPT | Mod: PBBFAC,,,

## 2023-10-26 PROCEDURE — 99999 PR PBB SHADOW E&M-EST. PATIENT-LVL I: ICD-10-PCS | Mod: PBBFAC,,,

## 2023-10-26 PROCEDURE — 99404 PREV MED CNSL INDIV APPRX 60: CPT | Mod: S$GLB,,,

## 2023-10-26 RX ORDER — IBUPROFEN 200 MG
1 TABLET ORAL DAILY
Qty: 14 PATCH | Refills: 0 | Status: SHIPPED | OUTPATIENT
Start: 2023-10-26 | End: 2024-03-25

## 2023-10-26 NOTE — PROGRESS NOTES
Individual Follow-Up Form    10/26/2023    Quit Date: TBD    Clinical Status of Patient: Outpatient    Length of Service: 60 minutes    Continuing Medication: yes  Patches or Nicotine Lozenges    Other Medications: none     Target Symptoms: Withdrawal and medication side effects. The following were  rated moderate (3) to severe (4) on TCRS:  Moderate (3): none  Severe (4): none    Comments: Patient seen in clinic regarding smoking cessation follow up. Patient continues to smoke 2-3 cpd and was able to complete 32 hour dry run. Commended pt for effort thus far. Discussed upcoming quit attempted as well as preparations to take. (Original goal 11/18 but encouraged pt to try) Pt?continues NRT 21 mg nicotine patch QD and 2 mg nicotine lozenge prn. No adverse reactions noted at this time. Reviewed strategies, habitual behavior, stress, and high risk situations. Pt's exhaled carbon monoxide level was?13?ppm as per Smokerlyzer. (non- smoker = 0-5 ppm.)? Pt to continue with counseling in approximately?2?weeks.     Diagnosis: F17.200    Next Visit: 2 weeks

## 2023-11-06 ENCOUNTER — TELEPHONE (OUTPATIENT)
Dept: SMOKING CESSATION | Facility: CLINIC | Age: 63
End: 2023-11-06
Payer: MEDICAID

## 2023-11-13 ENCOUNTER — CLINICAL SUPPORT (OUTPATIENT)
Dept: SMOKING CESSATION | Facility: CLINIC | Age: 63
End: 2023-11-13
Payer: COMMERCIAL

## 2023-11-13 DIAGNOSIS — F17.200 NICOTINE DEPENDENCE: Primary | ICD-10-CM

## 2023-11-13 PROCEDURE — 99402 PREV MED CNSL INDIV APPRX 30: CPT | Mod: S$GLB,,,

## 2023-11-13 PROCEDURE — 99402 PR PREVENT COUNSEL,INDIV,30 MIN: ICD-10-PCS | Mod: S$GLB,,,

## 2023-11-13 NOTE — PROGRESS NOTES
Individual Follow-Up Form    11/13/2023    Quit Date: TBD    Clinical Status of Patient: Outpatient    Length of Service: 30 minutes    Continuing Medication: yes  Patches    Other Medications: none     Target Symptoms: Withdrawal and medication side effects. The following were  rated moderate (3) to severe (4) on TCRS:  Moderate (3): desire/crave tobacco  Severe (4): none    Comments: Spoke with patient regarding smoking cessation follow up. Pt states that she is down to 3 cigarettes over the last week. Encouraged pt to remain positive and to attempt to quit before our next meeting. Pt understands that it is important to make preparations for quit attempt. Discussed mindset and pt's thoughts about quitting. Encouraged pt to visualize her life being free from tobacco. Pt remains on 14 mg nicotine patch QD with no adverse effects noted at this time. She is able to go long stretches without any nicotine at all. Reviewed learned addiction model, triggers, cues, and short/long term consequences of tobacco use. Pt to attend in person counseling session in 2 weeks for follow up.      Diagnosis: F17.200    Next Visit: 2 weeks

## 2023-11-16 ENCOUNTER — LAB VISIT (OUTPATIENT)
Dept: LAB | Facility: HOSPITAL | Age: 63
End: 2023-11-16
Attending: INTERNAL MEDICINE
Payer: MEDICAID

## 2023-11-16 DIAGNOSIS — D84.9 IMMUNOCOMPROMISED: ICD-10-CM

## 2023-11-16 DIAGNOSIS — E03.9 ACQUIRED HYPOTHYROIDISM: ICD-10-CM

## 2023-11-16 DIAGNOSIS — R76.8 POSITIVE ANA (ANTINUCLEAR ANTIBODY): ICD-10-CM

## 2023-11-16 LAB
CK SERPL-CCNC: 33 U/L (ref 20–180)
LDH SERPL L TO P-CCNC: 148 U/L (ref 110–260)
T4 FREE SERPL-MCNC: 0.86 NG/DL (ref 0.71–1.51)
TSH SERPL DL<=0.005 MIU/L-ACNC: 4.71 UIU/ML (ref 0.4–4)

## 2023-11-16 PROCEDURE — 82085 ASSAY OF ALDOLASE: CPT | Performed by: INTERNAL MEDICINE

## 2023-11-16 PROCEDURE — 83615 LACTATE (LD) (LDH) ENZYME: CPT | Performed by: INTERNAL MEDICINE

## 2023-11-16 PROCEDURE — 84443 ASSAY THYROID STIM HORMONE: CPT | Performed by: FAMILY MEDICINE

## 2023-11-16 PROCEDURE — 82550 ASSAY OF CK (CPK): CPT | Performed by: INTERNAL MEDICINE

## 2023-11-16 PROCEDURE — 36415 COLL VENOUS BLD VENIPUNCTURE: CPT | Performed by: INTERNAL MEDICINE

## 2023-11-16 PROCEDURE — 86480 TB TEST CELL IMMUN MEASURE: CPT | Performed by: INTERNAL MEDICINE

## 2023-11-16 PROCEDURE — 84439 ASSAY OF FREE THYROXINE: CPT | Performed by: FAMILY MEDICINE

## 2023-11-17 LAB
ALDOLASE SERPL-CCNC: 2.6 U/L (ref 1.2–7.6)
GAMMA INTERFERON BACKGROUND BLD IA-ACNC: 0.04 IU/ML
M TB IFN-G CD4+ BCKGRND COR BLD-ACNC: -0.01 IU/ML
M TB IFN-G CD4+ BCKGRND COR BLD-ACNC: -0.01 IU/ML
MITOGEN IGNF BCKGRD COR BLD-ACNC: 4.76 IU/ML
TB GOLD PLUS: NEGATIVE

## 2023-11-27 ENCOUNTER — TELEPHONE (OUTPATIENT)
Dept: SMOKING CESSATION | Facility: CLINIC | Age: 63
End: 2023-11-27
Payer: MEDICAID

## 2023-11-27 NOTE — TELEPHONE ENCOUNTER
Attempted to contact patient regarding smoking cessation follow up. There was no answer at this time. Left message with contact information.

## 2023-11-29 ENCOUNTER — CLINICAL SUPPORT (OUTPATIENT)
Dept: SMOKING CESSATION | Facility: CLINIC | Age: 63
End: 2023-11-29
Payer: COMMERCIAL

## 2023-11-29 DIAGNOSIS — F17.200 NICOTINE DEPENDENCE: Primary | ICD-10-CM

## 2023-11-29 PROCEDURE — 99407 BEHAV CHNG SMOKING > 10 MIN: CPT | Mod: S$GLB,,,

## 2023-11-29 PROCEDURE — 99407 PR TOBACCO USE CESSATION INTENSIVE >10 MINUTES: ICD-10-PCS | Mod: S$GLB,,,

## 2023-11-29 NOTE — PROGRESS NOTES
"Spoke with patient today in regard to smoking cessation progress for 3 month telephone follow up, she states not tobacco free. Patient states "not having good luck" with quitting and not ready to return to the program at this time. Informed patient of benefit period, future follow ups, and contact information if any further help or support is needed. Will complete smart form for 3 month follow up on Quit attempt #1.      "

## 2024-02-05 ENCOUNTER — TELEPHONE (OUTPATIENT)
Dept: SMOKING CESSATION | Facility: CLINIC | Age: 64
End: 2024-02-05
Payer: MEDICARE

## 2024-02-26 ENCOUNTER — TELEPHONE (OUTPATIENT)
Dept: SMOKING CESSATION | Facility: CLINIC | Age: 64
End: 2024-02-26
Payer: MEDICARE

## 2024-02-29 ENCOUNTER — CLINICAL SUPPORT (OUTPATIENT)
Dept: SMOKING CESSATION | Facility: CLINIC | Age: 64
End: 2024-02-29
Payer: COMMERCIAL

## 2024-02-29 DIAGNOSIS — F17.200 NICOTINE DEPENDENCE: Primary | ICD-10-CM

## 2024-02-29 PROCEDURE — 99407 BEHAV CHNG SMOKING > 10 MIN: CPT | Mod: S$GLB,,,

## 2024-02-29 NOTE — PROGRESS NOTES
Spoke with patient today in regard to smoking cessation progress for 6 month phone follow up on quit 1. Patient not tobacco free at this time.  Patient has scheduled an appointment to return to the program for Quit attempt #2.  Patient continues to feel anxious about attempting to quit again.  Patient states she feels a lot of outside pressure to quit.  Informed patient of benefit period, future follow ups, and contact information if any further help or support is needed.  Will complete smart form on Quit attempt #1.

## 2024-03-08 ENCOUNTER — LAB VISIT (OUTPATIENT)
Dept: LAB | Facility: HOSPITAL | Age: 64
End: 2024-03-08
Attending: FAMILY MEDICINE
Payer: MEDICARE

## 2024-03-08 DIAGNOSIS — E03.9 ACQUIRED HYPOTHYROIDISM: ICD-10-CM

## 2024-03-08 LAB
T4 FREE SERPL-MCNC: 1.08 NG/DL (ref 0.71–1.51)
TSH SERPL DL<=0.005 MIU/L-ACNC: 1.5 UIU/ML (ref 0.4–4)

## 2024-03-08 PROCEDURE — 84439 ASSAY OF FREE THYROXINE: CPT | Performed by: FAMILY MEDICINE

## 2024-03-08 PROCEDURE — 36415 COLL VENOUS BLD VENIPUNCTURE: CPT | Performed by: FAMILY MEDICINE

## 2024-03-08 PROCEDURE — 84443 ASSAY THYROID STIM HORMONE: CPT | Performed by: FAMILY MEDICINE

## 2024-03-25 PROBLEM — D84.9 IMMUNOCOMPROMISED: Status: ACTIVE | Noted: 2024-03-25

## 2024-03-25 PROBLEM — M33.20 POLYMYOSITIS: Status: RESOLVED | Noted: 2022-06-30 | Resolved: 2024-03-25

## 2024-03-25 PROBLEM — J84.9 ILD (INTERSTITIAL LUNG DISEASE): Status: ACTIVE | Noted: 2024-03-25

## 2024-03-25 PROBLEM — M33.10 AMYOPATHIC DERMATOMYOSITIS: Status: ACTIVE | Noted: 2024-03-25

## 2024-03-25 PROBLEM — R76.8 POSITIVE ANA (ANTINUCLEAR ANTIBODY): Status: ACTIVE | Noted: 2024-03-25

## 2024-04-03 ENCOUNTER — LAB VISIT (OUTPATIENT)
Dept: LAB | Facility: HOSPITAL | Age: 64
End: 2024-04-03
Attending: INTERNAL MEDICINE
Payer: MEDICARE

## 2024-04-03 DIAGNOSIS — D84.9 IMMUNOCOMPROMISED: ICD-10-CM

## 2024-04-03 PROCEDURE — 87186 SC STD MICRODIL/AGAR DIL: CPT | Performed by: INTERNAL MEDICINE

## 2024-04-03 PROCEDURE — 87077 CULTURE AEROBIC IDENTIFY: CPT | Performed by: INTERNAL MEDICINE

## 2024-04-03 PROCEDURE — 87088 URINE BACTERIA CULTURE: CPT | Performed by: INTERNAL MEDICINE

## 2024-04-03 PROCEDURE — 87086 URINE CULTURE/COLONY COUNT: CPT | Performed by: INTERNAL MEDICINE

## 2024-04-05 LAB — BACTERIA UR CULT: ABNORMAL

## 2024-07-17 ENCOUNTER — TELEPHONE (OUTPATIENT)
Dept: SMOKING CESSATION | Facility: CLINIC | Age: 64
End: 2024-07-17
Payer: MEDICARE

## 2024-07-17 NOTE — TELEPHONE ENCOUNTER
Spoke with patient regarding smoking cessation program. Patient states that she is not ready to return to the program at this time. She would like to contact smoking cessation department when she feels ready to make another quit attempt. She expresses frustration over department's continued efforts to reach out. I assured Ms. Bae that I would make a note of this in the chart. Contact information provided.

## 2024-08-01 ENCOUNTER — LAB VISIT (OUTPATIENT)
Dept: LAB | Facility: HOSPITAL | Age: 64
End: 2024-08-01
Attending: INTERNAL MEDICINE
Payer: MEDICARE

## 2024-08-01 DIAGNOSIS — R35.0 URINARY FREQUENCY: ICD-10-CM

## 2024-08-01 LAB
BACTERIA #/AREA URNS HPF: ABNORMAL /HPF
BILIRUB UR QL STRIP: NEGATIVE
CLARITY UR: ABNORMAL
COLOR UR: YELLOW
GLUCOSE UR QL STRIP: NEGATIVE
HGB UR QL STRIP: NEGATIVE
HYALINE CASTS #/AREA URNS LPF: 0 /LPF
KETONES UR QL STRIP: NEGATIVE
LEUKOCYTE ESTERASE UR QL STRIP: ABNORMAL
MICROSCOPIC COMMENT: ABNORMAL
NITRITE UR QL STRIP: NEGATIVE
PH UR STRIP: 6 [PH] (ref 5–8)
PROT UR QL STRIP: NEGATIVE
RBC #/AREA URNS HPF: 4 /HPF (ref 0–4)
SP GR UR STRIP: 1.01 (ref 1–1.03)
SQUAMOUS #/AREA URNS HPF: 3 /HPF
URN SPEC COLLECT METH UR: ABNORMAL
UROBILINOGEN UR STRIP-ACNC: 1 EU/DL
WBC #/AREA URNS HPF: 12 /HPF (ref 0–5)

## 2024-08-01 PROCEDURE — 87088 URINE BACTERIA CULTURE: CPT | Performed by: INTERNAL MEDICINE

## 2024-08-01 PROCEDURE — 81000 URINALYSIS NONAUTO W/SCOPE: CPT | Performed by: INTERNAL MEDICINE

## 2024-08-01 PROCEDURE — 87086 URINE CULTURE/COLONY COUNT: CPT | Performed by: INTERNAL MEDICINE

## 2024-08-02 LAB — BACTERIA UR CULT: ABNORMAL

## 2024-08-05 RX ORDER — AMOXICILLIN 875 MG/1
875 TABLET, FILM COATED ORAL 2 TIMES DAILY
Qty: 10 TABLET | Refills: 0 | Status: SHIPPED | OUTPATIENT
Start: 2024-08-05

## 2024-08-15 ENCOUNTER — TELEPHONE (OUTPATIENT)
Dept: SMOKING CESSATION | Facility: CLINIC | Age: 64
End: 2024-08-15
Payer: MEDICARE

## 2024-08-15 NOTE — TELEPHONE ENCOUNTER
Called patient about 12 month follow up. Left message for patient to call [phone]. Resolved quit episode.

## 2024-09-06 ENCOUNTER — LAB VISIT (OUTPATIENT)
Dept: LAB | Facility: HOSPITAL | Age: 64
End: 2024-09-06
Attending: PHYSICIAN ASSISTANT
Payer: MEDICARE

## 2024-09-06 DIAGNOSIS — N31.9 NEUROGENIC BLADDER: ICD-10-CM

## 2024-09-06 LAB
BACTERIA #/AREA URNS HPF: ABNORMAL /HPF
BILIRUB UR QL STRIP: NEGATIVE
CLARITY UR: ABNORMAL
COLOR UR: YELLOW
GLUCOSE UR QL STRIP: NEGATIVE
HGB UR QL STRIP: NEGATIVE
HYALINE CASTS #/AREA URNS LPF: 4.3 /LPF
KETONES UR QL STRIP: NEGATIVE
LEUKOCYTE ESTERASE UR QL STRIP: ABNORMAL
MICROSCOPIC COMMENT: ABNORMAL
NITRITE UR QL STRIP: NEGATIVE
PH UR STRIP: 7 [PH] (ref 5–8)
PROT UR QL STRIP: NEGATIVE
RBC #/AREA URNS HPF: 1 /HPF (ref 0–4)
SP GR UR STRIP: 1.01 (ref 1–1.03)
SQUAMOUS #/AREA URNS HPF: 2 /HPF
URN SPEC COLLECT METH UR: ABNORMAL
UROBILINOGEN UR STRIP-ACNC: 1 EU/DL
WBC #/AREA URNS HPF: >100 /HPF (ref 0–5)

## 2024-09-06 PROCEDURE — 87088 URINE BACTERIA CULTURE: CPT | Performed by: PHYSICIAN ASSISTANT

## 2024-09-06 PROCEDURE — 81000 URINALYSIS NONAUTO W/SCOPE: CPT | Performed by: PHYSICIAN ASSISTANT

## 2024-09-06 PROCEDURE — 87086 URINE CULTURE/COLONY COUNT: CPT | Performed by: PHYSICIAN ASSISTANT

## 2024-09-09 LAB — BACTERIA UR CULT: ABNORMAL

## 2024-09-12 ENCOUNTER — HOSPITAL ENCOUNTER (EMERGENCY)
Facility: HOSPITAL | Age: 64
Discharge: HOME | End: 2024-09-12
Attending: EMERGENCY MEDICINE
Payer: MEDICARE

## 2024-09-12 VITALS
WEIGHT: 193 LBS | BODY MASS INDEX: 30.29 KG/M2 | DIASTOLIC BLOOD PRESSURE: 63 MMHG | HEIGHT: 67 IN | TEMPERATURE: 99 F | HEART RATE: 66 BPM | SYSTOLIC BLOOD PRESSURE: 135 MMHG | OXYGEN SATURATION: 96 % | RESPIRATION RATE: 18 BRPM

## 2024-09-12 DIAGNOSIS — S20.211A CONTUSION OF RIGHT CHEST WALL, INITIAL ENCOUNTER: Primary | ICD-10-CM

## 2024-09-12 PROCEDURE — 99283 EMERGENCY DEPT VISIT LOW MDM: CPT | Mod: 25

## 2024-09-12 PROCEDURE — 25000003 PHARM REV CODE 250: Performed by: NURSE PRACTITIONER

## 2024-09-12 RX ORDER — HYDROCODONE BITARTRATE AND ACETAMINOPHEN 5; 325 MG/1; MG/1
1 TABLET ORAL EVERY 8 HOURS PRN
Qty: 6 TABLET | Refills: 0 | Status: SHIPPED | OUTPATIENT
Start: 2024-09-12 | End: 2024-09-13

## 2024-09-12 RX ORDER — HYDROCODONE BITARTRATE AND ACETAMINOPHEN 5; 325 MG/1; MG/1
1 TABLET ORAL EVERY 8 HOURS PRN
Qty: 6 TABLET | Refills: 0 | Status: SHIPPED | OUTPATIENT
Start: 2024-09-12 | End: 2024-09-12

## 2024-09-12 RX ORDER — HYDROCODONE BITARTRATE AND ACETAMINOPHEN 5; 325 MG/1; MG/1
1 TABLET ORAL
Status: COMPLETED | OUTPATIENT
Start: 2024-09-12 | End: 2024-09-12

## 2024-09-12 RX ADMIN — HYDROCODONE BITARTRATE AND ACETAMINOPHEN 1 TABLET: 5; 325 TABLET ORAL at 10:09

## 2024-09-12 NOTE — ED PROVIDER NOTES
"Encounter Date: 2024       History     Chief Complaint   Patient presents with    Fall     Pt reports she hit her right breast and rib area and right knee at 6pm yesterday after coming in from outside. Denies any head trauma.     This is a 63-year-old white female with a history of asthma and mental health problems who presents to the emergency department with complaints of right chest wall pain after sustaining a trip and fall prior to arrival.  She reports slipping on wet floor resulting in" straddle" and believes she may have jammed her right upper extremity/elbow into the chest wall.  She reports pain and tenderness "under" the right breast that is worse with coughing/deep breath/movement.  She denies any other major injuries or concerns at this time.      Review of patient's allergies indicates:   Allergen Reactions    Ciprofloxacin Hives    Nsaids (non-steroidal anti-inflammatory drug) Other (See Comments)     Sensitive stomach, has had GI bleed.  AVM's.  Only short term use okay.    Phenobarbital Hives    Statins-hmg-coa reductase inhibitors     Sulfa (sulfonamide antibiotics) Hives    Tramadol      Past Medical History:   Diagnosis Date    Asthma     Autoimmune disease     Autoimmune hepatitis     Bipolar 1 disorder     Depression     DVT (deep vein thrombosis) in pregnancy     GERD (gastroesophageal reflux disease)     History of GI bleed     Insomnia     Neuromuscular disorder     PE (pulmonary thromboembolism) 2010    Scleromyxedema     Thyroid disease      Past Surgical History:   Procedure Laterality Date    ABDOMINAL SURGERY      APPENDECTOMY      BACK SURGERY      BLADDER SUSPENSION      CARPAL TUNNEL RELEASE Bilateral     CERVICAL FUSION       SECTION      CHOLECYSTECTOMY      COLONOSCOPY N/A 2019    Procedure: COLONOSCOPY;  Surgeon: Jose M Luis MD;  Location: [facility];  Service: Endoscopy;  Laterality: N/A;    CORRECTION OF HAMMER TOE Left 2021    Procedure: " CORRECTION, HAMMER TOE;  Surgeon: Hansel Ybarra DPM;  Location: [facility] OR;  Service: Podiatry;  Laterality: Left;    ESOPHAGOGASTRODUODENOSCOPY N/A 12/19/2019    Procedure: EGD (ESOPHAGOGASTRODUODENOSCOPY);  Surgeon: Jose M Luis MD;  Location: [facility];  Service: Endoscopy;  Laterality: N/A;    FOOT ARTHRODESIS Left 03/26/2021    Procedure: FUSION, FOOT;  Surgeon: Hansel Ybarra DPM;  Location: [facility];  Service: Podiatry;  Laterality: Left;  Anesthesia  Stephen confirmed 3/25/21 [place]    GASTRIC BYPASS      GABRIEL FILTER PLACEMENT      HERNIA REPAIR      HYSTERECTOMY  2002    INCONTINENCE SURGERY  02/10/2020    LUMBAR FUSION      NECK SURGERY      OOPHORECTOMY Bilateral 2002    spleenectomy      MELVIN BUNIONECTOMY Left 03/26/2021    Procedure: EXCISION, BUNIONETTE;  Surgeon: Hansel Ybarra DPM;  Location: [facility];  Service: Podiatry;  Laterality: Left;    THYROID CYST EXCISION  1982    TONSILLECTOMY       Family History   Problem Relation Name Age of Onset    Colon cancer Mother  70        Colon Cancer    Breast cancer Mother      Breast cancer Father      Colon cancer Father  55        Colon Cancer    Breast cancer Sister      No Known Problems Daughter      No Known Problems Maternal Aunt      No Known Problems Maternal Uncle      No Known Problems Paternal Aunt      No Known Problems Paternal Uncle      Breast cancer Maternal Grandmother      No Known Problems Maternal Grandfather      Breast cancer Paternal Grandmother      No Known Problems Paternal Grandfather      No Known Problems Other      Ovarian cancer Neg Hx      BRCA 1/2 Neg Hx       Social History     Tobacco Use    Smoking status: Every Day     Current packs/day: 0.25     Average packs/day: 0.5 packs/day for 49.7 years (24.6 ttl pk-yrs)     Types: Cigarettes     Start date: 1973     Last attempt to quit: 2011    Smokeless tobacco: Never    Tobacco comments:     Patient is active in smoking cessation program. Currently 4-5 cig/day    Substance Use Topics    Alcohol use: Yes     Comment: Socially    Drug use: No     Review of Systems   Cardiovascular:  Positive for chest pain. Negative for palpitations and leg swelling.   Musculoskeletal:  Positive for arthralgias. Negative for gait problem.       Physical Exam     Initial Vitals [09/12/24 0948]   BP Pulse Resp Temp SpO2   135/63 66 18 98.6 °F (37 °C) 96 %      MAP       --         Physical Exam    Nursing note and vitals reviewed.  Constitutional: She appears well-developed and well-nourished. She is active. No distress.   HENT:   Head: Normocephalic and atraumatic.   Eyes: EOM are normal. Pupils are equal, round, and reactive to light.   Neck: Neck supple.   Normal range of motion.  Cardiovascular:  Normal rate, regular rhythm and normal heart sounds.           Pulmonary/Chest: Breath sounds normal. No respiratory distress. She exhibits tenderness.     Musculoskeletal:      Cervical back: Normal range of motion and neck supple.      Comments: There is minimal ecchymosis over the anterior aspect of the right knee with mild tenderness.  No localized swelling/effusion, distally NVI.     Neurological: She is alert and oriented to person, place, and time. GCS score is 15. GCS eye subscore is 4. GCS verbal subscore is 5. GCS motor subscore is 6.   Skin: Skin is warm and dry. Capillary refill takes less than 2 seconds.   Psychiatric: She has a normal mood and affect. Her behavior is normal. Thought content normal.         ED Course   Procedures  Labs Reviewed - No data to display       Imaging Results              X-Ray Chest AP Portable (Final result)  Result time 09/12/24 10:46:46      Final result by Aris Horvath MD (09/12/24 10:46:46)                   Impression:      Multifocal chronic findings with no acute radiographic abnormality detected.      Electronically signed by: Aris Horvath MD  Date:    09/12/2024  Time:    10:46               Narrative:    EXAMINATION:  XR CHEST AP  PORTABLE    CLINICAL HISTORY:  rib injury;    TECHNIQUE:  Frontal view obtained of the chest    COMPARISON:  09/09/2024 and 03/25/2024    FINDINGS:  Partially imaged cervical spine fusion hardware in place.  Spinal rods are in place in the lower thoracic spine extending into the upper lumbar spine out of the field of view.  Previous thoracolumbar vertebroplasty.  Stable cardiomediastinal contours.  Moderate atherosclerotic calcification overlies the aortic arch.  Probable operative suture plane overlying the lower aspect of the right hemithorax.  No detected consolidation or acute osseous change.                                       Medications   HYDROcodone-acetaminophen 5-325 mg per tablet 1 tablet (1 tablet Oral Given 9/12/24 1049)     Medical Decision Making  Amount and/or Complexity of Data Reviewed  Radiology: ordered.    Risk  Prescription drug management.               ED Course as of 09/12/24 1127   Thu Sep 12, 2024   1056 No acute finding on chest x-ray [CB]      ED Course User Index  [CB] Tammi Mae NP                           Clinical Impression:  Final diagnoses:  [S20.211A] Contusion of right chest wall, initial encounter (Primary)          ED Disposition Condition    Discharge Stable          ED Prescriptions       Medication Sig Dispense Start Date End Date Auth. Provider    HYDROcodone-acetaminophen (NORCO) 5-325 mg per tablet Take 1 tablet by mouth every 8 (eight) hours as needed for Pain. 6 tablet 9/12/2024 9/14/2024 Tammi Mae NP          Follow-up Information       Follow up With Specialties Details Why Contact Info    PCP Follow UP  Schedule an appointment as soon as possible for a visit in 2 days for follow-up, for re-evaluation of today's complaint              Tammi Mae NP  09/12/24 1127

## 2024-09-12 NOTE — DISCHARGE INSTRUCTIONS
Take medication as directed.  Plan to follow-up with your doctor next week.  Return to the emergency department for worsening condition.

## 2024-09-12 NOTE — ED NOTES
Pt reports she would like to change her pharmacy for medication to [facility] pharmacy. NP notified.

## 2024-10-21 ENCOUNTER — HOSPITAL ENCOUNTER (EMERGENCY)
Facility: HOSPITAL | Age: 64
Discharge: HOME | End: 2024-10-21
Attending: EMERGENCY MEDICINE
Payer: OTHER GOVERNMENT

## 2024-10-21 ENCOUNTER — NURSE TRIAGE (OUTPATIENT)
Dept: ADMINISTRATIVE | Facility: CLINIC | Age: 64
End: 2024-10-21
Payer: OTHER GOVERNMENT

## 2024-10-21 VITALS
OXYGEN SATURATION: 97 % | DIASTOLIC BLOOD PRESSURE: 74 MMHG | SYSTOLIC BLOOD PRESSURE: 140 MMHG | RESPIRATION RATE: 18 BRPM | TEMPERATURE: 99 F | BODY MASS INDEX: 31.08 KG/M2 | WEIGHT: 198 LBS | HEIGHT: 67 IN | HEART RATE: 60 BPM

## 2024-10-21 DIAGNOSIS — J20.9 ACUTE BRONCHITIS, UNSPECIFIED ORGANISM: Primary | ICD-10-CM

## 2024-10-21 PROCEDURE — 63600175 PHARM REV CODE 636 W HCPCS: Performed by: NURSE PRACTITIONER

## 2024-10-21 PROCEDURE — 96372 THER/PROPH/DIAG INJ SC/IM: CPT | Performed by: NURSE PRACTITIONER

## 2024-10-21 PROCEDURE — 99284 EMERGENCY DEPT VISIT MOD MDM: CPT | Mod: 25

## 2024-10-21 RX ORDER — AZITHROMYCIN 250 MG/1
TABLET, FILM COATED ORAL
Qty: 6 TABLET | Refills: 0 | Status: SHIPPED | OUTPATIENT
Start: 2024-10-21

## 2024-10-21 RX ORDER — PROMETHAZINE HYDROCHLORIDE AND DEXTROMETHORPHAN HYDROBROMIDE 6.25; 15 MG/5ML; MG/5ML
5 SYRUP ORAL EVERY 4 HOURS PRN
Qty: 473 ML | Refills: 0 | Status: SHIPPED | OUTPATIENT
Start: 2024-10-21 | End: 2024-10-31

## 2024-10-21 RX ORDER — PROMETHAZINE HYDROCHLORIDE AND CODEINE PHOSPHATE 6.25; 1 MG/5ML; MG/5ML
5 SOLUTION ORAL EVERY 6 HOURS PRN
Qty: 240 ML | Refills: 0 | Status: SHIPPED | OUTPATIENT
Start: 2024-10-21 | End: 2024-10-21 | Stop reason: ALTCHOICE

## 2024-10-21 RX ORDER — PREDNISONE 10 MG/1
10 TABLET ORAL DAILY
Qty: 21 TABLET | Refills: 0 | Status: SHIPPED | OUTPATIENT
Start: 2024-10-21 | End: 2024-10-25

## 2024-10-21 RX ORDER — METHYLPREDNISOLONE SOD SUCC 125 MG
125 VIAL (EA) INJECTION
Status: COMPLETED | OUTPATIENT
Start: 2024-10-21 | End: 2024-10-21

## 2024-10-21 RX ORDER — ALBUTEROL SULFATE 1.25 MG/3ML
1.25 SOLUTION RESPIRATORY (INHALATION) EVERY 6 HOURS PRN
Qty: 75 ML | Refills: 0 | Status: SHIPPED | OUTPATIENT
Start: 2024-10-21 | End: 2025-10-21

## 2024-10-21 RX ADMIN — METHYLPREDNISOLONE SODIUM SUCCINATE 125 MG: 125 INJECTION, POWDER, FOR SOLUTION INTRAMUSCULAR; INTRAVENOUS at 01:10

## 2024-10-21 NOTE — DISCHARGE INSTRUCTIONS
Take medications as directed and be sure to complete all antibiotics. Alternate Tylenol and Motrin every 3 hours as directed for pain/fever.  Return to the emergency room for worsening condition.

## 2024-10-21 NOTE — PLAN OF CARE
[facility] Emergency Department Plan of Care Note    Referral source: Nurse On-Call      Discussed patient with: Nurse On Call      Reason for consult: Shortness of Breath      Medical Record Review:  patient presents with severe coughing for the last 7 days and found to be short winded and hoarse by triage nurse.  Also having ripping abdominal pain.      Disposition recommended:   Recommended emergency department follow up  today for further evaluation of abdominal pain and respiratory issues

## 2024-10-21 NOTE — ED PROVIDER NOTES
Encounter Date: 10/21/2024       History     Chief Complaint   Patient presents with    Cough     Productive painful cough x 1 week, mucinex and tessalon not improving     This is a 63-year-old white female with a history of asthma who presents to the emergency department with complaints of URI signs and symptoms over the last week, characterized by mild stuffy/runny nose, voice hoarseness, and worsening chronic cough with chest tightness and wheezing.  Symptoms are unrelieved with home albuterol inhaler.  She denies measured fever or vomiting.      Review of patient's allergies indicates:   Allergen Reactions    Ciprofloxacin Hives    Nsaids (non-steroidal anti-inflammatory drug) Other (See Comments)     Sensitive stomach, has had GI bleed.  AVM's.  Only short term use okay.    Phenobarbital Hives    Statins-hmg-coa reductase inhibitors     Sulfa (sulfonamide antibiotics) Hives    Tramadol      Past Medical History:   Diagnosis Date    Asthma     Autoimmune disease     Autoimmune hepatitis     Bipolar 1 disorder     Depression     DVT (deep vein thrombosis) in pregnancy     GERD (gastroesophageal reflux disease)     History of GI bleed     Insomnia     Neuromuscular disorder     PE (pulmonary thromboembolism)     Rib pain     Scleromyxedema     Thyroid disease      Past Surgical History:   Procedure Laterality Date    ABDOMINAL SURGERY      APPENDECTOMY      BACK SURGERY      BLADDER SUSPENSION      CARPAL TUNNEL RELEASE Bilateral     CERVICAL FUSION       SECTION      CHOLECYSTECTOMY      COLONOSCOPY N/A 2019    Procedure: COLONOSCOPY;  Surgeon: Jose M Luis MD;  [facility];  Service: Endoscopy;  Laterality: N/A;    CORRECTION OF HAMMER TOE Left 2021    Procedure: CORRECTION, HAMMER TOE;  Surgeon: Hansel Ybarra DPM;  Location: [facility];  Service: Podiatry;  Laterality: Left;    ESOPHAGOGASTRODUODENOSCOPY N/A 2019    Procedure: EGD (ESOPHAGOGASTRODUODENOSCOPY);   Surgeon: Jose M Luis MD;  Location: [facility];  Service: Endoscopy;  Laterality: N/A;    FOOT ARTHRODESIS Left 03/26/2021    Procedure: FUSION, FOOT;  Surgeon: Hansel Ybarra DPM;  Location: [facility] OR;  Service: Podiatry;  Laterality: Left;  Anesthesia  Stephen confirmed 3/25/21 [place]    GASTRIC BYPASS      GABRIEL FILTER PLACEMENT      HERNIA REPAIR      HYSTERECTOMY  2002    INCONTINENCE SURGERY  02/10/2020    LUMBAR FUSION      NECK SURGERY      OOPHORECTOMY Bilateral 2002    spleenectomy      MELVIN BUNIONECTOMY Left 03/26/2021    Procedure: EXCISION, BUNIONETTE;  Surgeon: Hansel Ybarra DPM;  Location: [facility] OR;  Service: Podiatry;  Laterality: Left;    THYROID CYST EXCISION  1982    TONSILLECTOMY       Family History   Problem Relation Name Age of Onset    Colon cancer Mother  70        Colon Cancer    Breast cancer Mother      Breast cancer Father      Colon cancer Father  55        Colon Cancer    Breast cancer Sister      No Known Problems Daughter      No Known Problems Maternal Aunt      No Known Problems Maternal Uncle      No Known Problems Paternal Aunt      No Known Problems Paternal Uncle      Breast cancer Maternal Grandmother      No Known Problems Maternal Grandfather      Breast cancer Paternal Grandmother      No Known Problems Paternal Grandfather      No Known Problems Other      Ovarian cancer Neg Hx      BRCA 1/2 Neg Hx       Social History     Tobacco Use    Smoking status: Every Day     Current packs/day: 0.25     Average packs/day: 0.5 packs/day for 49.8 years (24.6 ttl pk-yrs)     Types: Cigarettes     Start date: 1973     Last attempt to quit: 2011    Smokeless tobacco: Never    Tobacco comments:     Patient is active in smoking cessation program. Currently 4-5 cig/day   Substance Use Topics    Alcohol use: Yes     Comment: Socially    Drug use: No     Review of Systems   Constitutional:  Positive for fatigue. Negative for appetite change and fever.   HENT:  Positive for  congestion and voice change.    Respiratory:  Positive for cough, chest tightness, shortness of breath and wheezing.    Gastrointestinal:  Positive for abdominal pain (with cough). Negative for diarrhea and vomiting.       Physical Exam     Initial Vitals [10/21/24 1254]   BP Pulse Resp Temp SpO2   (!) 140/74 60 18 98.7 °F (37.1 °C) 97 %      MAP       --         Physical Exam    Nursing note and vitals reviewed.  Constitutional: She appears well-developed and well-nourished. She is active. No distress.   HENT:   Head: Normocephalic and atraumatic.   Eyes: EOM are normal. Pupils are equal, round, and reactive to light.   Neck: Neck supple.   Normal range of motion.  Cardiovascular:  Normal rate, regular rhythm and normal heart sounds.           Pulmonary/Chest: No respiratory distress. She has wheezes (inspiratory and expiratory bilaterally).   Musculoskeletal:         General: Normal range of motion.      Cervical back: Normal range of motion and neck supple.     Neurological: She is alert and oriented to person, place, and time. GCS score is 15. GCS eye subscore is 4. GCS verbal subscore is 5. GCS motor subscore is 6.   Skin: Skin is warm and dry. Capillary refill takes less than 2 seconds.   Psychiatric: She has a normal mood and affect. Her behavior is normal. Thought content normal.         ED Course   Procedures  Labs Reviewed - No data to display       Imaging Results    None          Medications   methylPREDNISolone sodium succinate injection 125 mg (125 mg Intramuscular Given 10/21/24 1319)     Medical Decision Making             ED Course as of 10/21/24 1323   Mon Oct 21, 2024   1319 Given patient history and comorbidities will cover with antibiotics to help reduce risk for hospitalization. [CB]      ED Course User Index  [CB] Tammi Mae NP                           Clinical Impression:  Final diagnoses:  [J20.9] Acute bronchitis, unspecified organism (Primary)          ED Disposition Condition     Discharge Stable          ED Prescriptions       Medication Sig Dispense Start Date End Date Auth. Provider    predniSONE (DELTASONE) 10 MG tablet Take 1 tablet (10 mg total) by mouth once daily. Take 4 tabs x 3 days, then take 2 tabs x 3 days, then take 1 tab x 3 days. 21 tablet 10/21/2024 -- Tammi Mae NP    promethazine-codeine 6.25-10 mg/5 ml (PHENERGAN WITH CODEINE) 6.25-10 mg/5 mL syrup Take 5 mLs by mouth every 6 (six) hours as needed for Cough. 240 mL 10/21/2024 10/28/2024 Tammi Mae NP    albuterol (ACCUNEB) 1.25 mg/3 mL Nebu Take 3 mLs (1.25 mg total) by nebulization every 6 (six) hours as needed. Rescue 75 mL 10/21/2024 10/21/2025 Tammi Mae NP    azithromycin (Z-GREGORIO) 250 MG tablet Take 2 tablets by mouth on day 1; Take 1 tablet by mouth on days 2-5 6 tablet 10/21/2024 -- Tammi Mae NP          Follow-up Information       Follow up With Specialties Details Why Contact Info    PCP Follow UP  Schedule an appointment as soon as possible for a visit in 2 days for follow-up, for re-evaluation of today's complaint              Tammi Mae NP  10/21/24 7182

## 2024-10-21 NOTE — TELEPHONE ENCOUNTER
Shreya c/o abdominal pain described as ripping sensation, due to severe coughing x 7 days. Feels lightheaded with coughing spells. Amaya is AAO & sounds short winded & hoarseness present with talking. Attempted to treat with OTC meds and Tessalon Perles but no relief. Hx of Pertussis. Pt states cough feels similar to Pertussis cough. Per triage protocol, go to ED/[facility] now (or to office with PCP approval). Advised pt per Ajit Meeks OCP's advice to go to nearest ED now for physician mihaela. Instructed to call  now if no immediate . V/u.     Reason for Disposition   Patient sounds very sick or weak to the triager    Additional Information   Negative: Bluish (or gray) lips or face   Negative: SEVERE difficulty breathing (e.g., struggling for each breath, speaks in single words)   Negative: Rapid onset of cough and has hives   Negative: Coughing started suddenly after medicine, an allergic food or bee sting   Negative: Difficulty breathing after exposure to flames, smoke, or fumes   Negative: Sounds like a life-threatening emergency to the triager   Negative: MODERATE difficulty breathing (e.g., speaks in phrases, SOB even at rest, pulse 100-120) and still present when not coughing   Negative: Chest pain present when not coughing   Negative: Passed out (e.g., fainted, lost consciousness, blacked out and was not responding)    Protocols used: Cough-A-[place]

## 2024-10-22 ENCOUNTER — ON-DEMAND VIRTUAL (OUTPATIENT)
Dept: URGENT CARE | Facility: CLINIC | Age: 64
End: 2024-10-22
Payer: OTHER GOVERNMENT

## 2024-10-22 ENCOUNTER — NURSE TRIAGE (OUTPATIENT)
Dept: ADMINISTRATIVE | Facility: CLINIC | Age: 64
End: 2024-10-22
Payer: OTHER GOVERNMENT

## 2024-10-22 DIAGNOSIS — R05.9 COUGH, UNSPECIFIED TYPE: Primary | ICD-10-CM

## 2024-10-22 PROCEDURE — 99212 OFFICE O/P EST SF 10 MIN: CPT | Mod: 95,,, | Performed by: NURSE PRACTITIONER

## 2024-10-22 NOTE — TELEPHONE ENCOUNTER
Pt called with c/o cough. Pt stated she spoke to nurse yesterday and was told to go to ER. Pt seen in ER and given medications. Pt stated her pharmacy refused to fill the cough medicine with codeine. Pt then called ER and had them switch to different cough syrup. Pt reports the medication is not relieving her cough. Pt declining triage at this time. Stated she would like to speak to a provider for a different medication. [facility] offered and accepted.   Reason for Disposition   Requesting regular office appointment    Protocols used: Information Only Call - No Triage-A-

## 2024-10-22 NOTE — PROGRESS NOTES
Subjective:      Patient ID: Shreya Bae is a 63 y.o. female.    Vitals:  vitals were not taken for this visit.     Chief Complaint: Cough      Visit Type: TELE AUDIOVISUAL    Present with the patient at the time of consultation: TELEMED PRESENT WITH PATIENT: None, at home    Past Medical History:   Diagnosis Date    Asthma     Autoimmune disease     Autoimmune hepatitis     Bipolar 1 disorder     Depression     DVT (deep vein thrombosis) in pregnancy     GERD (gastroesophageal reflux disease)     History of GI bleed     Insomnia     Neuromuscular disorder     PE (pulmonary thromboembolism) 2010    Rib pain     Scleromyxedema     Thyroid disease      Past Surgical History:   Procedure Laterality Date    ABDOMINAL SURGERY      APPENDECTOMY      BACK SURGERY      BLADDER SUSPENSION      CARPAL TUNNEL RELEASE Bilateral     CERVICAL FUSION       SECTION      CHOLECYSTECTOMY      COLONOSCOPY N/A 2019    Procedure: COLONOSCOPY;  Surgeon: Jose M Luis MD;  Location: [facility];  Service: Endoscopy;  Laterality: N/A;    CORRECTION OF HAMMER TOE Left 2021    Procedure: CORRECTION, HAMMER TOE;  Surgeon: Hansel Ybarra DPM;  Location: [facility];  Service: Podiatry;  Laterality: Left;    ESOPHAGOGASTRODUODENOSCOPY N/A 2019    Procedure: EGD (ESOPHAGOGASTRODUODENOSCOPY);  Surgeon: Jose M Luis MD;  Location: [facility];  Service: Endoscopy;  Laterality: N/A;    FOOT ARTHRODESIS Left 2021    Procedure: FUSION, FOOT;  Surgeon: Hansel Ybarra DPM;  Location: [facility];  Service: Podiatry;  Laterality: Left;  Anesthesia  Stephen confirmed 3/25/21 [place]    GASTRIC BYPASS      GABRIEL FILTER PLACEMENT      HERNIA REPAIR      HYSTERECTOMY  2002    INCONTINENCE SURGERY  02/10/2020    LUMBAR FUSION      NECK SURGERY      OOPHORECTOMY Bilateral 2002    spleenectomy      MELVIN BUNIONECTOMY Left 2021    Procedure: EXCISION, BUNIONETTE;  Surgeon: Hansel Ybarra DPM;  Location:  [facility] OR;  Service: Podiatry;  Laterality: Left;    THYROID CYST EXCISION  1982    TONSILLECTOMY       Review of patient's allergies indicates:   Allergen Reactions    Ciprofloxacin Hives    Nsaids (non-steroidal anti-inflammatory drug) Other (See Comments)     Sensitive stomach, has had GI bleed.  AVM's.  Only short term use okay.    Phenobarbital Hives    Statins-hmg-coa reductase inhibitors     Sulfa (sulfonamide antibiotics) Hives    Tramadol      Current Outpatient Medications on File Prior to Visit   Medication Sig Dispense Refill    albuterol (ACCUNEB) 1.25 mg/3 mL Nebu Take 3 mLs (1.25 mg total) by nebulization every 6 (six) hours as needed. Rescue 75 mL 0    alendronate (FOSAMAX) 70 MG tablet Take 1 tablet (70 mg total) by mouth every 7 days. 4 tablet 11    amitriptyline (ELAVIL) 50 MG tablet Take 50 mg by mouth nightly.  5    amLODIPine (NORVASC) 10 MG tablet Take 1 tablet (10 mg total) by mouth once daily. 90 tablet 3    ammonium lactate (LAC-HYDRIN) 12 % lotion Apply topically 2 (two) times daily. 1 each 10    azithromycin (Z-GREGORIO) 250 MG tablet Take 2 tablets by mouth on day 1; Take 1 tablet by mouth on days 2-5 6 tablet 0    calcium-vitamin D 600 mg-10 mcg (400 unit) Tab Take 1 tablet by mouth 2 (two) times daily with meals. 180 each 3    fenofibrate (TRICOR) 48 MG tablet Take 1 tablet (48 mg total) by mouth once daily. 90 tablet 3    fluconazole (DIFLUCAN) 200 MG Tab Take 1 tablet (200 mg total) by mouth Every 3 (three) days. 15 tablet 2    fluocinolone (SYNALAR) 0.01 % external solution Apply topically once daily. Use on back of scalp (Patient not taking: Reported on 7/30/2024) 60 mL 5    fluticasone-umeclidin-vilanter (TRELEGY ELLIPTA) 200-62.5-25 mcg inhaler Inhale 1 puff into the lungs once daily. 1 each 11    ketoconazole (NIZORAL) 2 % cream Apply topically 2 (two) times daily. 60 g 10    levocetirizine (XYZAL) 5 MG tablet TAKE 1 TABLET BY MOUTH EVERY EVENING 30 tablet 11    levothyroxine  (SYNTHROID) 88 MCG tablet Take 1 tablet (88 mcg total) by mouth once daily. 90 tablet 3    lithium (ESKALITH) 300 MG capsule Take 600 mg by mouth once daily. Takes 2 capsules 300mg daily      metoprolol tartrate (LOPRESSOR) 25 MG tablet Take 1 tablet (25 mg total) by mouth 2 (two) times daily. 180 tablet 3    montelukast (SINGULAIR) 10 mg tablet TAKE 1 TABLET BY MOUTH EVERY EVENING 30 tablet 11    predniSONE (DELTASONE) 10 MG tablet Take 1 tablet (10 mg total) by mouth once daily. Take 4 tabs x 3 days, then take 2 tabs x 3 days, then take 1 tab x 3 days. 21 tablet 0    predniSONE (DELTASONE) 20 MG tablet Take 1 tablet (20 mg total) by mouth once daily. 5 tablet 0    promethazine (PHENERGAN) 25 MG tablet Take 1 tablet (25 mg total) by mouth every 6 (six) hours as needed for Nausea. 15 tablet 0    promethazine-dextromethorphan (PROMETHAZINE-DM) 6.25-15 mg/5 mL Syrp Take 5 mLs by mouth every 4 (four) hours as needed (Cough, congestion). 473 mL 0    tofacitinib (XELJANZ XR) 11 mg Tb24 Take 1 tablet (11 mg) by mouth once daily. 90 tablet 3    trazodone (DESYREL) 100 MG tablet Take 200 mg by mouth every evening.       triamcinolone acetonide 0.1% (KENALOG) 0.1 % cream Apply topically 2 (two) times daily. 80 g 6    [DISCONTINUED] oxyCODONE-acetaminophen (PERCOCET) 5-325 mg per tablet Take 1 tablet by mouth every 8 (eight) hours as needed for Pain. 15 tablet 0     Current Facility-Administered Medications on File Prior to Visit   Medication Dose Route Frequency Provider Last Rate Last Admin    lactated ringers infusion   Intravenous Continuous Talia Watson NP        LIDOcaine (PF) 10 mg/ml (1%) injection 10 mg  1 mL Intradermal Once Talia Watson NP         Family History   Problem Relation Name Age of Onset    Colon cancer Mother  70        Colon Cancer    Breast cancer Mother      Breast cancer Father      Colon cancer Father  55        Colon Cancer    Breast cancer Sister      No Known Problems Daughter      No  Known Problems Maternal Aunt      No Known Problems Maternal Uncle      No Known Problems Paternal Aunt      No Known Problems Paternal Uncle      Breast cancer Maternal Grandmother      No Known Problems Maternal Grandfather      Breast cancer Paternal Grandmother      No Known Problems Paternal Grandfather      No Known Problems Other      Ovarian cancer Neg Hx      BRCA 1/2 Neg Hx             Ohs Peq Odvv Intake    10/22/2024  1:16 PM CDT - Filed by Patient   What is your current physical address in the event of a medical emergency? [address]   Are you able to take your vital signs? No   Please attach any relevant images or files    Is your employer contracted with [facility]? No         Seen yesterday in ED. Dx with bronchitis. Given steroid, azithromycin and promethazine. No relief with meds. Seeking further cough relief. Codeine has been helpful in the past.    Cough        Respiratory:  Positive for cough.         Objective:   The physical exam was conducted virtually.  Physical Exam   Constitutional: She is oriented to person, place, and time. She does not appear ill. No distress.   HENT:   Head: Normocephalic and atraumatic.   Nose: Nose normal.   Eyes: Extraocular movement intact   Pulmonary/Chest: Effort normal.   Abdominal: Normal appearance.   Musculoskeletal: Normal range of motion.         General: Normal range of motion.   Neurological: no focal deficit. She is alert and oriented to person, place, and time.   Psychiatric: Her behavior is normal. Mood normal.   Vitals reviewed.      Assessment:     1. Cough, unspecified type        Plan:   Unable to prescribe Codeine. Follow-up with pulmonology as discussed.    Patient encouraged to monitor symptoms closely and instructed to follow-up for new or worsening symptoms. Further, in-person, evaluation may be necessary for continued treatment. Please follow up with your primary care doctor or specialist as needed. Verbally discussed  plan. Patient confirms understanding and is in agreement with treatment and plan.     You must understand that you've received a Virtual Care evaluation only and that you may be released before all your medical problems are known or treated. You, the patient, will arrange for follow up care as instructed.      Cough, unspecified type

## 2024-10-22 NOTE — TELEPHONE ENCOUNTER
Pt seen in ER yesterday and treated as bronchitis, then had a virtual urgent care visit after nurse triage message from earlier today.  Problem resolved.

## 2024-11-25 ENCOUNTER — HOSPITAL ENCOUNTER (OUTPATIENT)
Dept: RADIOLOGY | Facility: HOSPITAL | Age: 64
Discharge: HOME | End: 2024-11-25
Attending: UROLOGY
Payer: MEDICARE

## 2024-11-25 DIAGNOSIS — N22 CALCULUS OF URINARY TRACT IN DISEASES CLASSIFIED ELSEWHERE: ICD-10-CM

## 2024-11-25 PROCEDURE — 74176 CT ABD & PELVIS W/O CONTRAST: CPT | Mod: TC

## 2024-11-26 ENCOUNTER — PATIENT MESSAGE (OUTPATIENT)
Dept: ADMINISTRATIVE | Facility: HOSPITAL | Age: 64
End: 2024-11-26
Payer: MEDICARE

## 2024-12-23 ENCOUNTER — PATIENT MESSAGE (OUTPATIENT)
Dept: ADMINISTRATIVE | Facility: HOSPITAL | Age: 64
End: 2024-12-23
Payer: MEDICARE

## 2025-01-27 ENCOUNTER — HOSPITAL ENCOUNTER (OUTPATIENT)
Dept: RADIOLOGY | Facility: HOSPITAL | Age: 65
Discharge: HOME | End: 2025-01-27
Attending: INTERNAL MEDICINE
Payer: MEDICARE

## 2025-01-27 DIAGNOSIS — J18.9 COMMUNITY ACQUIRED PNEUMONIA, UNSPECIFIED LATERALITY: ICD-10-CM

## 2025-01-27 PROCEDURE — 71046 X-RAY EXAM CHEST 2 VIEWS: CPT | Mod: TC

## 2025-03-05 PROBLEM — K75.4 AUTOIMMUNE HEPATITIS: Status: ACTIVE | Noted: 2025-03-05

## 2025-03-08 PROBLEM — T38.0X5A STEROID-INDUCED HYPERGLYCEMIA: Status: ACTIVE | Noted: 2025-03-08

## 2025-03-08 PROBLEM — R73.9 STEROID-INDUCED HYPERGLYCEMIA: Status: ACTIVE | Noted: 2025-03-08

## 2025-03-28 PROBLEM — F17.200 TOBACCO DEPENDENCE: Status: ACTIVE | Noted: 2025-03-28

## 2025-03-28 PROBLEM — J82.81 CHRONIC EOSINOPHILIC PNEUMONIA: Status: ACTIVE | Noted: 2025-03-28

## 2025-04-01 PROBLEM — J45.50 SEVERE PERSISTENT ASTHMA, UNCOMPLICATED: Status: ACTIVE | Noted: 2025-04-01

## 2025-04-02 PROBLEM — E78.5 HLD (HYPERLIPIDEMIA): Status: ACTIVE | Noted: 2025-04-02

## 2025-04-02 PROBLEM — E66.09 CLASS 1 OBESITY DUE TO EXCESS CALORIES WITHOUT SERIOUS COMORBIDITY WITH BODY MASS INDEX (BMI) OF 32.0 TO 32.9 IN ADULT: Status: RESOLVED | Noted: 2022-06-30 | Resolved: 2025-04-02

## 2025-04-02 PROBLEM — I51.7 LEFT ATRIAL ENLARGEMENT: Status: ACTIVE | Noted: 2025-04-02

## 2025-04-02 PROBLEM — I51.7 RIGHT ATRIAL ENLARGEMENT: Status: ACTIVE | Noted: 2025-04-02

## 2025-04-02 PROBLEM — E66.09 CLASS 1 OBESITY DUE TO EXCESS CALORIES WITHOUT SERIOUS COMORBIDITY WITH BODY MASS INDEX (BMI) OF 34.0 TO 34.9 IN ADULT: Status: ACTIVE | Noted: 2025-04-02

## 2025-04-02 PROBLEM — R06.01 ORTHOPNEA: Status: ACTIVE | Noted: 2025-04-02

## 2025-04-02 PROBLEM — E11.9 TYPE 2 DIABETES MELLITUS WITHOUT COMPLICATION, WITHOUT LONG-TERM CURRENT USE OF INSULIN: Status: ACTIVE | Noted: 2025-04-02

## 2025-04-02 PROBLEM — E66.811 CLASS 1 OBESITY DUE TO EXCESS CALORIES WITHOUT SERIOUS COMORBIDITY WITH BODY MASS INDEX (BMI) OF 32.0 TO 32.9 IN ADULT: Status: RESOLVED | Noted: 2022-06-30 | Resolved: 2025-04-02

## 2025-04-02 PROBLEM — R60.9 EDEMA: Status: ACTIVE | Noted: 2025-04-02

## 2025-04-02 PROBLEM — I34.0 NONRHEUMATIC MITRAL VALVE REGURGITATION: Status: ACTIVE | Noted: 2025-04-02

## 2025-04-02 PROBLEM — I51.89 DIASTOLIC DYSFUNCTION: Status: ACTIVE | Noted: 2025-04-02

## 2025-04-02 PROBLEM — E66.09 CLASS 1 OBESITY DUE TO EXCESS CALORIES WITHOUT SERIOUS COMORBIDITY WITH BODY MASS INDEX (BMI) OF 34.0 TO 34.9 IN ADULT: Status: RESOLVED | Noted: 2025-04-02 | Resolved: 2025-04-02

## 2025-04-02 PROBLEM — F17.200 TOBACCO DEPENDENCE: Status: RESOLVED | Noted: 2025-03-28 | Resolved: 2025-04-02

## 2025-04-02 PROBLEM — Z87.891 HISTORY OF TOBACCO ABUSE: Status: ACTIVE | Noted: 2025-04-02

## 2025-04-02 PROBLEM — E66.811 CLASS 1 OBESITY DUE TO EXCESS CALORIES WITHOUT SERIOUS COMORBIDITY WITH BODY MASS INDEX (BMI) OF 34.0 TO 34.9 IN ADULT: Status: ACTIVE | Noted: 2025-04-02

## 2025-04-02 PROBLEM — Z91.89 MULTIPLE RISK FACTORS FOR CORONARY ARTERY DISEASE: Status: ACTIVE | Noted: 2025-04-02

## 2025-04-02 PROBLEM — R09.89 SUSPECTED CHF (CONGESTIVE HEART FAILURE): Status: ACTIVE | Noted: 2025-04-02

## 2025-04-02 PROBLEM — I10 HTN (HYPERTENSION): Status: RESOLVED | Noted: 2020-06-09 | Resolved: 2025-04-02

## 2025-04-02 PROBLEM — I37.1 NONRHEUMATIC PULMONARY VALVE INSUFFICIENCY: Status: ACTIVE | Noted: 2025-04-02

## 2025-04-02 PROBLEM — Q21.12 PFO (PATENT FORAMEN OVALE): Status: ACTIVE | Noted: 2025-04-02

## 2025-04-02 PROBLEM — R94.31 ABNORMAL EKG: Status: ACTIVE | Noted: 2025-04-02

## 2025-04-02 PROBLEM — I20.89 EQUIVALENT ANGINA: Status: ACTIVE | Noted: 2025-04-02

## 2025-04-02 PROBLEM — Z98.84 HISTORY OF GASTRIC BYPASS: Status: RESOLVED | Noted: 2020-03-26 | Resolved: 2025-04-02

## 2025-04-02 PROBLEM — I36.1 NONRHEUMATIC TRICUSPID VALVE REGURGITATION: Status: ACTIVE | Noted: 2025-04-02

## 2025-04-02 PROBLEM — Z72.0 TOBACCO ABUSE: Status: ACTIVE | Noted: 2025-04-02

## 2025-04-02 PROBLEM — E66.811 CLASS 1 OBESITY DUE TO EXCESS CALORIES WITHOUT SERIOUS COMORBIDITY WITH BODY MASS INDEX (BMI) OF 34.0 TO 34.9 IN ADULT: Status: RESOLVED | Noted: 2025-04-02 | Resolved: 2025-04-02

## 2025-05-01 ENCOUNTER — PATIENT MESSAGE (OUTPATIENT)
Dept: ADMINISTRATIVE | Facility: OTHER | Age: 65
End: 2025-05-01
Payer: OTHER GOVERNMENT

## 2025-05-14 DIAGNOSIS — E11.9 TYPE 2 DIABETES MELLITUS WITHOUT COMPLICATION: ICD-10-CM

## 2025-05-14 PROBLEM — Z79.4 TYPE 2 DIABETES MELLITUS WITH HYPERGLYCEMIA, WITH LONG-TERM CURRENT USE OF INSULIN: Status: ACTIVE | Noted: 2025-04-02

## 2025-05-14 PROBLEM — E11.65 TYPE 2 DIABETES MELLITUS WITH HYPERGLYCEMIA, WITH LONG-TERM CURRENT USE OF INSULIN: Status: ACTIVE | Noted: 2025-04-02

## 2025-05-16 ENCOUNTER — LAB VISIT (OUTPATIENT)
Dept: LAB | Facility: HOSPITAL | Age: 65
End: 2025-05-16
Attending: INTERNAL MEDICINE
Payer: MEDICARE

## 2025-05-16 DIAGNOSIS — E09.9 STEROID-INDUCED DIABETES MELLITUS, SUBSEQUENT ENCOUNTER: ICD-10-CM

## 2025-05-16 DIAGNOSIS — E11.9 TYPE 2 DIABETES MELLITUS WITHOUT COMPLICATION: ICD-10-CM

## 2025-05-16 DIAGNOSIS — T38.0X5D STEROID-INDUCED DIABETES MELLITUS, SUBSEQUENT ENCOUNTER: ICD-10-CM

## 2025-05-16 DIAGNOSIS — R76.8 POSITIVE ANA (ANTINUCLEAR ANTIBODY): ICD-10-CM

## 2025-05-16 LAB
ABSOLUTE EOSINOPHIL (OHS): 0.04 K/UL
ABSOLUTE MONOCYTE (OHS): 1.39 K/UL (ref 0.3–1)
ABSOLUTE NEUTROPHIL COUNT (OHS): 4.97 K/UL (ref 1.8–7.7)
ALBUMIN SERPL BCP-MCNC: 3.9 G/DL (ref 3.5–5.2)
ALBUMIN/CREAT UR: 8.1 UG/MG
ALP SERPL-CCNC: 67 UNIT/L (ref 40–150)
ALT SERPL W/O P-5'-P-CCNC: 17 UNIT/L (ref 10–44)
ANION GAP (OHS): 11 MMOL/L (ref 8–16)
AST SERPL-CCNC: 31 UNIT/L (ref 11–45)
BACTERIA #/AREA URNS AUTO: ABNORMAL /HPF
BASOPHILS # BLD AUTO: 0.06 K/UL
BASOPHILS NFR BLD AUTO: 0.6 %
BILIRUB SERPL-MCNC: 0.5 MG/DL (ref 0.1–1)
BILIRUB UR QL STRIP.AUTO: NEGATIVE
BUN SERPL-MCNC: 12 MG/DL (ref 8–23)
C3 SERPL-MCNC: 157 MG/DL (ref 50–180)
C4 COMPLEMENT (OHS): 15 MG/DL (ref 11–44)
CALCIUM SERPL-MCNC: 9.4 MG/DL (ref 8.7–10.5)
CHLORIDE SERPL-SCNC: 105 MMOL/L (ref 95–110)
CLARITY UR: ABNORMAL
CO2 SERPL-SCNC: 20 MMOL/L (ref 23–29)
COLOR UR AUTO: YELLOW
CREAT SERPL-MCNC: 0.8 MG/DL (ref 0.5–1.4)
CREAT UR-MCNC: 271.9 MG/DL (ref 15–325)
CRP SERPL-MCNC: 10.9 MG/L
EAG (OHS): 157 MG/DL (ref 68–131)
ERYTHROCYTE [DISTWIDTH] IN BLOOD BY AUTOMATED COUNT: 17.4 % (ref 11.5–14.5)
ERYTHROCYTE [SEDIMENTATION RATE] IN BLOOD: 23 MM/HR
GFR SERPLBLD CREATININE-BSD FMLA CKD-EPI: >60 ML/MIN/1.73/M2
GLUCOSE SERPL-MCNC: 105 MG/DL (ref 70–110)
GLUCOSE UR QL STRIP: NEGATIVE
HBA1C MFR BLD: 7.1 % (ref 4–5.6)
HCT VFR BLD AUTO: 39.3 % (ref 37–48.5)
HGB BLD-MCNC: 12.8 GM/DL (ref 12–16)
HGB UR QL STRIP: NEGATIVE
HYALINE CASTS UR QL AUTO: 0 /LPF (ref 0–1)
IMM GRANULOCYTES # BLD AUTO: 0.08 K/UL (ref 0–0.04)
IMM GRANULOCYTES NFR BLD AUTO: 0.8 % (ref 0–0.5)
KETONES UR QL STRIP: ABNORMAL
LEUKOCYTE ESTERASE UR QL STRIP: ABNORMAL
LYMPHOCYTES # BLD AUTO: 4.07 K/UL (ref 1–4.8)
MCH RBC QN AUTO: 29.3 PG (ref 27–31)
MCHC RBC AUTO-ENTMCNC: 32.6 G/DL (ref 32–36)
MCV RBC AUTO: 90 FL (ref 82–98)
MICROALBUMIN UR-MCNC: 22 UG/ML (ref ?–5000)
MICROSCOPIC COMMENT: ABNORMAL
NITRITE UR QL STRIP: POSITIVE
NUCLEATED RBC (/100WBC) (OHS): 0 /100 WBC
PH UR STRIP: 6 [PH]
PLATELET # BLD AUTO: 523 K/UL (ref 150–450)
PMV BLD AUTO: 10.4 FL (ref 9.2–12.9)
POTASSIUM SERPL-SCNC: 3.9 MMOL/L (ref 3.5–5.1)
PROT SERPL-MCNC: 7.6 GM/DL (ref 6–8.4)
PROT UR QL STRIP: NEGATIVE
RBC # BLD AUTO: 4.37 M/UL (ref 4–5.4)
RBC #/AREA URNS AUTO: 4 /HPF (ref 0–4)
RELATIVE EOSINOPHIL (OHS): 0.4 %
RELATIVE LYMPHOCYTE (OHS): 38.4 % (ref 18–48)
RELATIVE MONOCYTE (OHS): 13.1 % (ref 4–15)
RELATIVE NEUTROPHIL (OHS): 46.7 % (ref 38–73)
SODIUM SERPL-SCNC: 136 MMOL/L (ref 136–145)
SP GR UR STRIP: 1.02
SQUAMOUS #/AREA URNS AUTO: 27 /HPF
UROBILINOGEN UR STRIP-ACNC: 1 EU/DL
WBC # BLD AUTO: 10.61 K/UL (ref 3.9–12.7)
WBC #/AREA URNS AUTO: >100 /HPF (ref 0–5)

## 2025-05-16 PROCEDURE — 85025 COMPLETE CBC W/AUTO DIFF WBC: CPT

## 2025-05-16 PROCEDURE — 86160 COMPLEMENT ANTIGEN: CPT | Mod: 59

## 2025-05-16 PROCEDURE — 82570 ASSAY OF URINE CREATININE: CPT

## 2025-05-16 PROCEDURE — 81003 URINALYSIS AUTO W/O SCOPE: CPT

## 2025-05-16 PROCEDURE — 86160 COMPLEMENT ANTIGEN: CPT

## 2025-05-16 PROCEDURE — 80053 COMPREHEN METABOLIC PANEL: CPT

## 2025-05-16 PROCEDURE — 85651 RBC SED RATE NONAUTOMATED: CPT

## 2025-05-16 PROCEDURE — 36415 COLL VENOUS BLD VENIPUNCTURE: CPT

## 2025-05-16 PROCEDURE — 86140 C-REACTIVE PROTEIN: CPT

## 2025-05-16 PROCEDURE — 83036 HEMOGLOBIN GLYCOSYLATED A1C: CPT

## 2025-05-19 ENCOUNTER — LAB VISIT (OUTPATIENT)
Dept: LAB | Facility: HOSPITAL | Age: 65
End: 2025-05-19
Attending: INTERNAL MEDICINE
Payer: MEDICARE

## 2025-05-19 DIAGNOSIS — R82.90 ABNORMAL URINALYSIS: ICD-10-CM

## 2025-05-19 LAB
BACTERIA #/AREA URNS AUTO: ABNORMAL /HPF
BILIRUB UR QL STRIP.AUTO: NEGATIVE
CLARITY UR: ABNORMAL
COLOR UR AUTO: YELLOW
GLUCOSE UR QL STRIP: NEGATIVE
HGB UR QL STRIP: NEGATIVE
HOLD SPECIMEN: NORMAL
HYALINE CASTS UR QL AUTO: 5 /LPF (ref 0–1)
KETONES UR QL STRIP: NEGATIVE
LEUKOCYTE ESTERASE UR QL STRIP: ABNORMAL
MICROSCOPIC COMMENT: ABNORMAL
NITRITE UR QL STRIP: POSITIVE
PH UR STRIP: 6 [PH]
PROT UR QL STRIP: NEGATIVE
RBC #/AREA URNS AUTO: 3 /HPF (ref 0–4)
SP GR UR STRIP: 1.02
SQUAMOUS #/AREA URNS AUTO: 2 /HPF
UROBILINOGEN UR STRIP-ACNC: 1 EU/DL
WBC #/AREA URNS AUTO: 44 /HPF (ref 0–5)

## 2025-05-19 PROCEDURE — 87088 URINE BACTERIA CULTURE: CPT

## 2025-05-22 LAB — BACTERIA UR CULT: ABNORMAL

## 2025-07-07 ENCOUNTER — LAB VISIT (OUTPATIENT)
Dept: LAB | Facility: HOSPITAL | Age: 65
End: 2025-07-07
Attending: FAMILY MEDICINE
Payer: MEDICARE

## 2025-07-07 DIAGNOSIS — J84.10 PULMONARY FIBROSIS: ICD-10-CM

## 2025-07-07 DIAGNOSIS — J84.9 ILD (INTERSTITIAL LUNG DISEASE): ICD-10-CM

## 2025-07-07 DIAGNOSIS — E03.9 ACQUIRED HYPOTHYROIDISM: ICD-10-CM

## 2025-07-07 DIAGNOSIS — J84.9 INTERSTITIAL PULMONARY DISEASE, UNSPECIFIED: ICD-10-CM

## 2025-07-07 DIAGNOSIS — I10 PRIMARY HYPERTENSION: ICD-10-CM

## 2025-07-07 LAB
ABSOLUTE EOSINOPHIL (OHS): 0.15 K/UL
ABSOLUTE MONOCYTE (OHS): 1.06 K/UL (ref 0.3–1)
ABSOLUTE NEUTROPHIL COUNT (OHS): 4.09 K/UL (ref 1.8–7.7)
ALBUMIN SERPL BCP-MCNC: 4.2 G/DL (ref 3.5–5.2)
ALP SERPL-CCNC: 57 UNIT/L (ref 40–150)
ALT SERPL W/O P-5'-P-CCNC: 29 UNIT/L (ref 10–44)
ANION GAP (OHS): 14 MMOL/L (ref 8–16)
AST SERPL-CCNC: 61 UNIT/L (ref 11–45)
BASOPHILS # BLD AUTO: 0.07 K/UL
BASOPHILS NFR BLD AUTO: 0.8 %
BILIRUB SERPL-MCNC: 0.5 MG/DL (ref 0.1–1)
BUN SERPL-MCNC: 11 MG/DL (ref 8–23)
CALCIUM SERPL-MCNC: 9.5 MG/DL (ref 8.7–10.5)
CHLORIDE SERPL-SCNC: 109 MMOL/L (ref 95–110)
CHOLEST SERPL-MCNC: 129 MG/DL (ref 120–199)
CHOLEST/HDLC SERPL: 3.1 {RATIO} (ref 2–5)
CO2 SERPL-SCNC: 17 MMOL/L (ref 23–29)
CREAT SERPL-MCNC: 0.8 MG/DL (ref 0.5–1.4)
ERYTHROCYTE [DISTWIDTH] IN BLOOD BY AUTOMATED COUNT: 17.1 % (ref 11.5–14.5)
GFR SERPLBLD CREATININE-BSD FMLA CKD-EPI: >60 ML/MIN/1.73/M2
GLUCOSE SERPL-MCNC: 84 MG/DL (ref 70–110)
HCT VFR BLD AUTO: 41 % (ref 37–48.5)
HDLC SERPL-MCNC: 42 MG/DL (ref 40–75)
HDLC SERPL: 32.6 % (ref 20–50)
HGB BLD-MCNC: 13.4 GM/DL (ref 12–16)
IMM GRANULOCYTES # BLD AUTO: 0.03 K/UL (ref 0–0.04)
IMM GRANULOCYTES NFR BLD AUTO: 0.3 % (ref 0–0.5)
LDLC SERPL CALC-MCNC: 65.8 MG/DL (ref 63–159)
LYMPHOCYTES # BLD AUTO: 3.18 K/UL (ref 1–4.8)
MCH RBC QN AUTO: 28.7 PG (ref 27–31)
MCHC RBC AUTO-ENTMCNC: 32.7 G/DL (ref 32–36)
MCV RBC AUTO: 88 FL (ref 82–98)
NONHDLC SERPL-MCNC: 87 MG/DL
NUCLEATED RBC (/100WBC) (OHS): 0 /100 WBC
PLATELET # BLD AUTO: 544 K/UL (ref 150–450)
PMV BLD AUTO: 9.6 FL (ref 9.2–12.9)
POTASSIUM SERPL-SCNC: 4 MMOL/L (ref 3.5–5.1)
PROT SERPL-MCNC: 8.1 GM/DL (ref 6–8.4)
RBC # BLD AUTO: 4.67 M/UL (ref 4–5.4)
RELATIVE EOSINOPHIL (OHS): 1.7 %
RELATIVE LYMPHOCYTE (OHS): 37.1 % (ref 18–48)
RELATIVE MONOCYTE (OHS): 12.4 % (ref 4–15)
RELATIVE NEUTROPHIL (OHS): 47.7 % (ref 38–73)
SODIUM SERPL-SCNC: 140 MMOL/L (ref 136–145)
TRIGL SERPL-MCNC: 106 MG/DL (ref 30–150)
TSH SERPL-ACNC: 1.13 UIU/ML (ref 0.4–4)
WBC # BLD AUTO: 8.58 K/UL (ref 3.9–12.7)

## 2025-07-07 PROCEDURE — 84443 ASSAY THYROID STIM HORMONE: CPT

## 2025-07-07 PROCEDURE — 84075 ASSAY ALKALINE PHOSPHATASE: CPT

## 2025-07-07 PROCEDURE — 86480 TB TEST CELL IMMUN MEASURE: CPT

## 2025-07-07 PROCEDURE — 36415 COLL VENOUS BLD VENIPUNCTURE: CPT

## 2025-07-07 PROCEDURE — 83718 ASSAY OF LIPOPROTEIN: CPT

## 2025-07-07 PROCEDURE — 85025 COMPLETE CBC W/AUTO DIFF WBC: CPT

## 2025-07-08 LAB
MITOGEN MINUS NIL (OHS): 8.08
NIL TB SYNCED (OHS): 0.01
QUANTIFERON GOLD INTERP (OHS): NEGATIVE
TB1 AG MINUS NIL (OHS): 0.01
TB2 AG MINUS NIL (OHS): 0.01

## 2025-07-23 NOTE — ED NOTES
Gastroenterology Note             Pre-operative History and Physical    Patient: Karin Tyson  : 1974  CSN: [id number]    History Obtained From:  Patient and/or guardian.     HISTORY OF PRESENT ILLNESS:    Indication: The patient is a 50 y.o. female  here for ileoscopy.  History of Crohn's disease.  Assessment of disease activity on adalimumab.    Past Medical History:    Past Medical History:   Diagnosis Date    Acute superficial venous thrombosis of left lower extremity     post operative, left thigh    Crohn disease (HCC)     Depression     Ileostomy in place (HCC)     Vitamin B12 deficiency      Past Surgical History:    Past Surgical History:   Procedure Laterality Date    BREAST ENHANCEMENT SURGERY  2003    COLECTOMY  2006    COSMETIC SURGERY      medial thigh lift    KNEE ARTHROSCOPY W/ DEBRIDEMENT Right     right and left--knee infections    RECTAL SURGERY      Transanal rectal resection    DOLLY-EN-Y GASTRIC BYPASS      350 lb pre surgery     Medications Prior to Admission:   No current facility-administered medications on file prior to encounter.     Current Outpatient Medications on File Prior to Encounter   Medication Sig Dispense Refill    LORazepam (ATIVAN) 2 MG tablet Take 2 tablets by mouth at bedtime.      CYLTEZO, 2 PEN, 40 MG/0.4ML AJKT inject 1 prefilled pen Subcutaneous every 14 days; Duration: 28 days  dispense 2 pens per 28days ES approved CaseId:[id number]; 2025-2026      sulfaSALAzine (AZULFIDINE) 500 MG tablet Take 1 tablet by mouth 2 times daily      QUEtiapine (SEROQUEL) 400 MG tablet Take 2 tablets by mouth nightly 60 tablet 0    furosemide (LASIX) 40 MG tablet TAKE 1 TABLET BY MOUTH DAILY AS NEEDED FOR WATER RETENTION 90 tablet 5    cyanocobalamin 1000 MCG/ML injection INJECT 1 ML MONTHLY; Duration: 90      Semaglutide, 1 MG/DOSE, 2 MG/1.5ML SOPN Inject 3 mg into the skin once a week      hydroquinone 4 % cream APPLY CREAM TOPICALLY TO THE AFFECTED  Admit team at bedside requesting two IVs. FRACISCO Bragg, to try to initiate IV access.

## (undated) DEVICE — DRESSING XEROFORM FOIL PK 1X8

## (undated) DEVICE — TOURNIQUET SB QC DP 18X4IN

## (undated) DEVICE — Device

## (undated) DEVICE — BLADE SAGITTA 5/BX

## (undated) DEVICE — SEE MEDLINE ITEM 152523

## (undated) DEVICE — GLOVE BIOGEL 7.0

## (undated) DEVICE — SUT 3-0 VICRYL / FS-2

## (undated) DEVICE — SEE MEDLINE ITEM 156953

## (undated) DEVICE — NDL 18GA X1 1/2 REG BEVEL

## (undated) DEVICE — NDL HYPO REG 25G X 1 1/2

## (undated) DEVICE — APPLICATOR CHLORAPREP ORN 26ML

## (undated) DEVICE — SYR 3CC LUER LOC

## (undated) DEVICE — SYR 10CC LUER LOCK

## (undated) DEVICE — SUT 3/0 18IN COATED VICRYLP

## (undated) DEVICE — COVER OVERHEAD SURG LT BLUE

## (undated) DEVICE — PAD PREP 50/CA

## (undated) DEVICE — GAUZE SPONGE 4X4 12PLY

## (undated) DEVICE — BANDAGE DERMACEA STRETCH 4X1IN

## (undated) DEVICE — SUT 4-0 ETHILON 18 PS-2

## (undated) DEVICE — BLADE SAGITTAL SAW

## (undated) DEVICE — STOCKINET TUBULAR 1 PLY 6X60IN

## (undated) DEVICE — SEE MEDLINE ITEM 152564

## (undated) DEVICE — SEE MEDLINE ITEM 146308

## (undated) DEVICE — SEE L#120831